# Patient Record
Sex: FEMALE | Race: WHITE | NOT HISPANIC OR LATINO | Employment: OTHER | URBAN - METROPOLITAN AREA
[De-identification: names, ages, dates, MRNs, and addresses within clinical notes are randomized per-mention and may not be internally consistent; named-entity substitution may affect disease eponyms.]

---

## 2023-10-02 DIAGNOSIS — G89.29 CHRONIC PELVIC PAIN IN FEMALE: Primary | ICD-10-CM

## 2023-10-02 DIAGNOSIS — R10.2 CHRONIC PELVIC PAIN IN FEMALE: Primary | ICD-10-CM

## 2023-10-02 RX ORDER — GABAPENTIN 100 MG/1
100 CAPSULE ORAL 2 TIMES DAILY
Qty: 60 CAPSULE | Refills: 0 | Status: SHIPPED | OUTPATIENT
Start: 2023-10-02 | End: 2023-10-11 | Stop reason: SDUPTHER

## 2023-10-03 PROBLEM — R10.2 CHRONIC PAIN IN FEMALE PELVIS: Status: ACTIVE | Noted: 2023-10-03

## 2023-10-03 PROBLEM — L68.0 HIRSUTISM: Status: ACTIVE | Noted: 2023-10-03

## 2023-10-03 PROBLEM — G89.29 CHRONIC PAIN IN FEMALE PELVIS: Status: ACTIVE | Noted: 2023-10-03

## 2023-10-03 PROBLEM — R23.4 OILY SKIN: Status: ACTIVE | Noted: 2023-10-03

## 2023-10-03 PROBLEM — L65.9 ALOPECIA: Status: ACTIVE | Noted: 2023-10-03

## 2023-10-03 PROBLEM — N80.9 ENDOMETRIOSIS: Status: ACTIVE | Noted: 2023-10-03

## 2023-10-03 PROBLEM — M62.838 TRAPEZIUS MUSCLE SPASM: Status: ACTIVE | Noted: 2023-10-03

## 2023-10-03 RX ORDER — CYCLOBENZAPRINE HCL 10 MG
TABLET ORAL
COMMUNITY
Start: 2022-04-26

## 2023-10-03 RX ORDER — DIAZEPAM 2 MG/1
1-2 TABLET ORAL 2 TIMES DAILY PRN
COMMUNITY

## 2023-10-03 RX ORDER — NORETHINDRONE ACETATE AND ETHINYL ESTRADIOL, ETHINYL ESTRADIOL AND FERROUS FUMARATE 1MG-10(24)
1 KIT ORAL DAILY
COMMUNITY
Start: 2023-02-07

## 2023-10-03 RX ORDER — PROGESTERONE 100 MG/1
CAPSULE ORAL
COMMUNITY
Start: 2022-05-24

## 2023-10-03 RX ORDER — CYCLOBENZAPRINE HCL 5 MG
5 TABLET ORAL 2 TIMES DAILY PRN
COMMUNITY
Start: 2023-09-15

## 2023-10-03 RX ORDER — SPIRONOLACTONE 50 MG/1
100 TABLET, FILM COATED ORAL
COMMUNITY
Start: 2023-06-28

## 2023-10-03 RX ORDER — HYDROCODONE BITARTRATE AND ACETAMINOPHEN 5; 325 MG/1; MG/1
1 TABLET ORAL EVERY 6 HOURS
COMMUNITY
Start: 2013-11-19

## 2023-10-03 RX ORDER — ESTRADIOL 0.03 MG/D
1 PATCH, EXTENDED RELEASE TRANSDERMAL 2 TIMES WEEKLY
COMMUNITY

## 2023-10-03 RX ORDER — METHYLPREDNISOLONE 4 MG/1
TABLET ORAL
COMMUNITY
Start: 2022-06-04

## 2023-10-03 RX ORDER — ALBUTEROL SULFATE 90 UG/1
1 AEROSOL, METERED RESPIRATORY (INHALATION) EVERY 4 HOURS PRN
COMMUNITY
Start: 2022-06-04

## 2023-10-05 ENCOUNTER — APPOINTMENT (OUTPATIENT)
Dept: OBSTETRICS AND GYNECOLOGY | Facility: CLINIC | Age: 48
End: 2023-10-05
Payer: COMMERCIAL

## 2023-10-11 ENCOUNTER — OFFICE VISIT (OUTPATIENT)
Dept: PAIN MEDICINE | Facility: HOSPITAL | Age: 48
End: 2023-10-11
Payer: COMMERCIAL

## 2023-10-11 VITALS
HEIGHT: 63 IN | HEART RATE: 91 BPM | RESPIRATION RATE: 16 BRPM | BODY MASS INDEX: 21.48 KG/M2 | SYSTOLIC BLOOD PRESSURE: 112 MMHG | DIASTOLIC BLOOD PRESSURE: 79 MMHG | WEIGHT: 121.2 LBS

## 2023-10-11 DIAGNOSIS — G89.29 CHRONIC PAIN IN FEMALE PELVIS: Primary | ICD-10-CM

## 2023-10-11 DIAGNOSIS — R10.2 CHRONIC PAIN IN FEMALE PELVIS: Primary | ICD-10-CM

## 2023-10-11 PROCEDURE — 1036F TOBACCO NON-USER: CPT | Performed by: PHYSICAL MEDICINE & REHABILITATION

## 2023-10-11 PROCEDURE — 99204 OFFICE O/P NEW MOD 45 MIN: CPT | Performed by: PHYSICAL MEDICINE & REHABILITATION

## 2023-10-11 PROCEDURE — 99214 OFFICE O/P EST MOD 30 MIN: CPT | Performed by: PHYSICAL MEDICINE & REHABILITATION

## 2023-10-11 RX ORDER — GABAPENTIN 300 MG/1
300 CAPSULE ORAL 3 TIMES DAILY
Qty: 90 CAPSULE | Refills: 2 | Status: SHIPPED | OUTPATIENT
Start: 2023-10-11 | End: 2023-11-14 | Stop reason: SDUPTHER

## 2023-10-11 ASSESSMENT — ENCOUNTER SYMPTOMS
LOSS OF SENSATION IN FEET: 0
DEPRESSION: 0
OCCASIONAL FEELINGS OF UNSTEADINESS: 0

## 2023-10-11 ASSESSMENT — PATIENT HEALTH QUESTIONNAIRE - PHQ9
1. LITTLE INTEREST OR PLEASURE IN DOING THINGS: NOT AT ALL
2. FEELING DOWN, DEPRESSED OR HOPELESS: NOT AT ALL
SUM OF ALL RESPONSES TO PHQ9 QUESTIONS 1 AND 2: 0

## 2023-10-11 NOTE — PROGRESS NOTES
Subjective   Patient ID: Elle Dumas is a 48 y.o. female who presents initial evaluation for chronic pelvic pain.    Patient reports the pain started in 2019. Does have a remote history of vacuum-assisted delivery and episiotomy requiring tear repair in 2005, however denies pelvic pain from that procedure. Denies any inciting event in 2019, states the pain initially started in the abdomen with radiated to her left back. Has had multiple procedures and surgeries since that time with goal of pain relief but reports none of these have helped. Underwent colonoscopy due to abdominal pain, no abnormal findings but patient reports she started to have a pulling sensation into her groin after the procedure. Pain was also thought to be due to endometriosis, underwent laparoscopy and eventual hysterectomy without pain relief. Was found to have migration of her hysterectomy surgical clips. Patient also has history of kidney stones and imaging revealed retained stones in her left kidney. Decision was made by Urology to perform kidney stone removal and clip removal simultaneously. Reports resolution of abdominal pain, but continues with pelvic pain.      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 Recently had bilateral pudendal nerve blocks and abdominal trigger point injections on 09/28/2023, states they did not help even temporarily with his pain.  These nerve blocks were done transvaginally by palpation.    Endorses pain with intercourse. Denies any bowel or bladder incontinence.     Now on Gabapentin, Valium, and Cyclobenzaprine as needed, started this regimen last week. Reports she has had the most pain relief with this combination of medications.  She would like to get off Valium.    Has an appointment with Dr. Herrera at [facility] for pudendal neuralgia    Location: midline pelvis and surrounding vagina  Radiation: denies  Quality: currently 2-3/10, at its worst 10/10  Exacerbated by sitting or any  other pressure to the area  Onset, traumatic event: denies    Patient denies bowel/bladder incontinence, denies fever, denies unintentional weight loss, denies clumsiness of hands, feet, or dropping things.  Denies any constitutional or myelopathic symptomatology.      PREVIOUS TREATMENTS  IN THE LAST SIX MONTHS     Active conservative therapy in the last six months (see below)              1. Physical therapy: Pelvic floor physical therapy, currently completing with last appointment last Thursday                                                                                                                     Monitoring and compliance:    ORT:    PDUQ:    Office Agreement: 10/11/23      Review of Systems     Current Outpatient Medications   Medication Instructions    albuterol 90 mcg/actuation inhaler 1 puff, inhalation, Every 4 hours PRN    cyclobenzaprine (Flexeril) 10 mg tablet oral    cyclobenzaprine (FLEXERIL) 5 mg, oral, 2 times daily PRN    diazePAM (Valium) 2 mg tablet 1-2 tablets, 2 times daily PRN, BY MOUTH OR VAGINALL    gabapentin (NEURONTIN) 300 mg, oral, 3 times daily    HYDROcodone-acetaminophen (Norco) 5-325 mg tablet 1 tablet, oral, Every 6 hours    Lo Loestrin Fe 1 mg-10 mcg (24)/10 mcg (2) tablet 1 tablet, oral, Daily    methylPREDNISolone (Medrol Dospak) 4 mg tablets TAKE BY MOUTH AS DIRECTED    progesterone (Prometrium) 100 mg capsule Progesterone 100 MG Oral Capsule   Quantity: 30  Refills: 0        Start : 24-May-2022   Active    spironolactone (ALDACTONE) 100 mg, oral, EVERY MORNING AND 1 TABLET EVERY EVENING    Vivelle-Dot 0.025 mg/24 hr patch 1 patch, transdermal, 2 times weekly        No past medical history on file.     Past Surgical History:   Procedure Laterality Date    BI US GUIDED BREAST LOCALIZATION AND BIOPSY LEFT Left 7/6/2018    BI US GUIDED BREAST LOCALIZATION AND BIOPSY LEFT [facility]        Family History   Problem Relation Name Age of Onset    Diabetes Father           Allergies   Allergen Reactions    Azithromycin Nausea And Vomiting    Ciprofloxacin Nausea And Vomiting    Adhesive Tape-Silicones Rash    Sulfamethoxazole-Trimethoprim Headache        Objective     Vitals:    10/11/23 1223   BP: 112/79   Pulse: 91   Resp: 16        Physical Exam    GENERAL EXAM  Vital Signs: Vital signs to include heart rate, respiration rate, blood pressure, and temperature were reviewed.  General Appearance:  Awake, alert, healthy appearing, well developed, No acute distress.  Head: Normocephalic without evidence of head injury.  Neck: The appearance of the neck was normal without swelling with a midline trachea.  Eyes: The eyelids and eyebrows exhibited no abnormalities.  Pupils were not pin-point.  Sclera was without icterus.  Lungs: Respiration rhythm and depth was normal.  Respiratory movements were normal without labored breathing.  Cardiovascular: No peripheral edema was present.    Neurological: Patient was oriented to time, place, and person.  Speech was normal.  Balance, gait, and stance were unremarkable.    Psychiatric: Appearance was normal with appropriate dress.  Mood was euthymic and affect was normal.  Skin: Affected regions were without ecchymosis or skin lesions.    Physical exam as above except:  Non-antalgic gait  No pain with log roll or hip compression bilaterally. Bilateral hip flexor hypertonicity and decreased internal rotation  5/5 muscle strength bilaterally  Negative slump test and straight leg raise  No pain with abdominal palpation    Assessment/Plan   Problem List Items Addressed This Visit             ICD-10-CM    Chronic pain in female pelvis - Primary R10.2, G89.29    Relevant Medications    gabapentin (Neurontin) 300 mg capsule   Elle Dumas is a 48 y.o. female who presents initial evaluation for chronic pelvic pain. No relief with recent in-office pudendal nerve block though she certainly does have some signs and symptoms of pudendal  neuralgia.    Plan:  - Increase Gabapentin to 300mg TID, patient titrate up per patient instructions given.  Could consider further titration up in the future  - Continue pelvic floor physical therapy, with emphasis on hip flexor ROM  - Consider Duloxetine for nerve/pelvic pain in future  - Consider consider fluoroscopic-guided pudendal nerve block as doing this block with image guidance may be more efficacious.    I saw and evaluated the patient. I personally obtained the key and critical portions of the history and physical exam or was physically present for key and critical portions performed by the resident/fellow. I reviewed the resident/fellow's documentation and discussed the patient with the resident/fellow. I agree with the resident/fellow's medical decision making as documented in the note.    Nash Manuel MD

## 2023-11-14 ENCOUNTER — OFFICE VISIT (OUTPATIENT)
Dept: PAIN MEDICINE | Facility: CLINIC | Age: 48
End: 2023-11-14
Payer: COMMERCIAL

## 2023-11-14 VITALS — DIASTOLIC BLOOD PRESSURE: 80 MMHG | HEART RATE: 76 BPM | RESPIRATION RATE: 16 BRPM | SYSTOLIC BLOOD PRESSURE: 125 MMHG

## 2023-11-14 DIAGNOSIS — R10.2 CHRONIC PAIN IN FEMALE PELVIS: Primary | ICD-10-CM

## 2023-11-14 DIAGNOSIS — G89.29 CHRONIC PAIN IN FEMALE PELVIS: Primary | ICD-10-CM

## 2023-11-14 PROCEDURE — 1036F TOBACCO NON-USER: CPT | Performed by: PHYSICAL MEDICINE & REHABILITATION

## 2023-11-14 PROCEDURE — 99214 OFFICE O/P EST MOD 30 MIN: CPT | Performed by: PHYSICAL MEDICINE & REHABILITATION

## 2023-11-14 RX ORDER — GABAPENTIN 300 MG/1
600 CAPSULE ORAL 3 TIMES DAILY
Qty: 180 CAPSULE | Refills: 2 | Status: SHIPPED | OUTPATIENT
Start: 2023-11-14 | End: 2024-02-29 | Stop reason: SDUPTHER

## 2023-11-14 ASSESSMENT — PAIN SCALES - GENERAL: PAINLEVEL: 4

## 2023-11-14 NOTE — PROGRESS NOTES
Subjective   Patient ID: Elle Dumas is a 48 y.o. female who presents for No chief complaint on file..  HPI    f                  OARRS:  No data recorded  {OARRSReview:00033}    Is the patient prescribed a combination of a benzodiazepine and opioid?  {Opioid/Benzo:33672}    Last Urine Drug Screen / ordered today: {YES (DEF)/NO:97235}  No results found for this or any previous visit (from the past 8760 hour(s)).  {ResultsAsExpected:82547}    Controlled Substance Agreement:  Date of the Last Agreement: ***  {CSA Attest:31321}    Monitoring and compliance:    ORT:    PDUQ:    Office Agreement:      Review of Systems     Current Outpatient Medications   Medication Instructions    albuterol 90 mcg/actuation inhaler 1 puff, inhalation, Every 4 hours PRN    cyclobenzaprine (Flexeril) 10 mg tablet oral    cyclobenzaprine (FLEXERIL) 5 mg, oral, 2 times daily PRN    diazePAM (Valium) 2 mg tablet 1-2 tablets, 2 times daily PRN, BY MOUTH OR VAGINALL    gabapentin (NEURONTIN) 300 mg, oral, 3 times daily    HYDROcodone-acetaminophen (Norco) 5-325 mg tablet 1 tablet, oral, Every 6 hours    Lo Loestrin Fe 1 mg-10 mcg (24)/10 mcg (2) tablet 1 tablet, oral, Daily    methylPREDNISolone (Medrol Dospak) 4 mg tablets TAKE BY MOUTH AS DIRECTED    progesterone (Prometrium) 100 mg capsule Progesterone 100 MG Oral Capsule   Quantity: 30  Refills: 0        Start : 24-May-2022   Active    spironolactone (ALDACTONE) 100 mg, oral, EVERY MORNING AND 1 TABLET EVERY EVENING    Vivelle-Dot 0.025 mg/24 hr patch 1 patch, transdermal, 2 times weekly        No past medical history on file.     Past Surgical History:   Procedure Laterality Date    BI US GUIDED BREAST LOCALIZATION AND BIOPSY LEFT Left 7/6/2018    BI US GUIDED BREAST LOCALIZATION AND BIOPSY LEFT [facility]        Family History   Problem Relation Name Age of Onset    Diabetes Father          Allergies   Allergen Reactions    Azithromycin Nausea And Vomiting    Ciprofloxacin  Nausea And Vomiting    Adhesive Tape-Silicones Rash    Sulfamethoxazole-Trimethoprim Headache        Objective     There were no vitals filed for this visit.     Physical Exam    GENERAL EXAM  Vital Signs: Vital signs to include heart rate, respiration rate, blood pressure, and temperature were reviewed.  General Appearance:  Awake, alert, healthy appearing, well developed, No acute distress.  Head: Normocephalic without evidence of head injury.  Neck: The appearance of the neck was normal without swelling with a midline trachea.  Eyes: The eyelids and eyebrows exhibited no abnormalities.  Pupils were not pin-point.  Sclera was without icterus.  Lungs: Respiration rhythm and depth was normal.  Respiratory movements were normal without labored breathing.  Cardiovascular: No peripheral edema was present.    Neurological: Patient was oriented to time, place, and person.  Speech was normal.  Balance, gait, and stance were unremarkable.    Psychiatric: Appearance was normal with appropriate dress.  Mood was euthymic and affect was normal.  Skin: Affected regions were without ecchymosis or skin lesions.        Physical exam as above except:        Assessment/Plan   {Assess/PlanSmartLinks:57212}

## 2023-11-14 NOTE — PROGRESS NOTES
Subjective   HPI:   Elle Dumas is a 48 year old female with history of hysterectomy and bilateral salpingo-oophorectomy, and chronic pelvic pain who presents for follow up evaluation of her pelvic pain. She was last seen 10/11/23 in clinic where she was started on gabapentin 300 TID for primary diagnosis of pudendal neuralgia. Since then, she also was seen by Dr. Herrera (gynecology) who recommended she stay on gabapentin. Patient reports she had 100% relief after starting gabapentin for 2.5 weeks, but then starting having some discomfort again. She has finn-vaginal pain with pain level VAS 4/10. Her goal pain is 3-4/10 and she is comfortable at this level. She denies any side effects such as headaches or drowsiness. Still reports that intercourse is uncomfortable, but her gynecologist started her on some estrogen cream which she needs to . States that sitting on pelvic floor makes the pain the worst. She has had some relief using a donut pillow. She has previously failed landmark-guided pudendal blocks by gynecology. She goes to pelvic floor PT 1x/week which is helping her.      OARRS:  No data recorded  I have personally reviewed the OARRS report for Elle Dumas. I have considered the risks of abuse, dependence, addiction and diversion    Is the patient prescribed a combination of a benzodiazepine and opioid?  No    Last Urine Drug Screen / ordered today: No  No results found for this or any previous visit (from the past 8760 hour(s)).  N/A    Controlled Substance Agreement:  Date of the Last Agreement: N/A      Review of Symptoms:   Constitutional: Negative for chills, diaphoresis or fever  HENT: Negative for neck swelling  Eyes:.  Negative for eye pain  Respiratory:.  Negative for cough, shortness of breath or wheezing    Cardiovascular:.  Negative for chest pain or palpitations  Gastrointestinal:.  Negative for abdominal pain, nausea and vomiting  Genitourinary:.  Positive for vaginal pain,  vaginal canal pain.  Musculoskeletal:: Denies falls within the past 3 months.  Skin: Negative for wounds or itching   Neurological: Negative for dizziness, seizures, loss of consciousness and weakness  Endo/Heme/Allergies: Does not bruise/bleed easily  Psychiatric/Behavioral: Negative for depression. The patient does not appear anxious.        Current Outpatient Medications   Medication Instructions    albuterol 90 mcg/actuation inhaler 1 puff, inhalation, Every 4 hours PRN    cyclobenzaprine (Flexeril) 10 mg tablet oral    cyclobenzaprine (FLEXERIL) 5 mg, oral, 2 times daily PRN    diazePAM (Valium) 2 mg tablet 1-2 tablets, 2 times daily PRN, BY MOUTH OR VAGINALL    gabapentin (NEURONTIN) 600 mg, oral, 3 times daily    HYDROcodone-acetaminophen (Norco) 5-325 mg tablet 1 tablet, oral, Every 6 hours    Lo Loestrin Fe 1 mg-10 mcg (24)/10 mcg (2) tablet 1 tablet, oral, Daily    methylPREDNISolone (Medrol Dospak) 4 mg tablets TAKE BY MOUTH AS DIRECTED    progesterone (Prometrium) 100 mg capsule Progesterone 100 MG Oral Capsule   Quantity: 30  Refills: 0        Start : 24-May-2022   Active    spironolactone (ALDACTONE) 100 mg, oral, EVERY MORNING AND 1 TABLET EVERY EVENING    Vivelle-Dot 0.025 mg/24 hr patch 1 patch, transdermal, 2 times weekly        No past medical history on file.     Past Surgical History:   Procedure Laterality Date    BI US GUIDED BREAST LOCALIZATION AND BIOPSY LEFT Left 7/6/2018    BI US GUIDED BREAST LOCALIZATION AND BIOPSY LEFT [facility]        Family History   Problem Relation Name Age of Onset    Diabetes Father          Allergies   Allergen Reactions    Azithromycin Nausea And Vomiting    Ciprofloxacin Nausea And Vomiting    Adhesive Tape-Silicones Rash    Sulfamethoxazole-Trimethoprim Headache        Objective     Vitals:    11/14/23 1107   BP: 125/80   Pulse: 76   Resp: 16        Physical Exam    GENERAL EXAM  Vital Signs: Vital signs to include heart rate, respiration rate, blood  pressure, and temperature were reviewed.  General Appearance:  Awake, alert, healthy appearing, well developed, No acute distress.  Head: Normocephalic without evidence of head injury.  Neck: The appearance of the neck was normal without swelling with a midline trachea.  Eyes: The eyelids and eyebrows exhibited no abnormalities.  Pupils were not pin-point.  Sclera was without icterus.  Lungs: Respiration rhythm and depth was normal.  Respiratory movements were normal without labored breathing.  Cardiovascular: No peripheral edema was present.    Neurological: Patient was oriented to time, place, and person.  Speech was normal.  Balance, gait, and stance were unremarkable.    Psychiatric: Appearance was normal with appropriate dress.  Mood was euthymic and affect was normal.  Skin: Affected regions were without ecchymosis or skin lesions.      Assessment/Plan     Elle Dumas is a 48 year old female with history of hysterectomy and bilateral salpingo-oophorectomy, and chronic pelvic pain who presents for follow up evaluation of her perivaginal pelvic pain likely due to pudendal neuralgia. She was started on gabapentin 10/2023 and has had a great response of her pain relief, but could use further uptitration for pain control.     Plan:  Uptitrate gabapentin to 600mg PO TID. Discussed risks and benefits.   Continue pelvic floor physical therapy, with emphasis on hip flexor ROM   Consider fluroscopic-guided pudendal nerve block as doing this block with image guidance can be more efficacious       Diagnoses and all orders for this visit:  Chronic pain in female pelvis  -     gabapentin (Neurontin) 300 mg capsule; Take 2 capsules (600 mg) by mouth 3 times a day.         I saw and evaluated the patient. I personally obtained the key and critical portions of the history and physical exam or was physically present for key and critical portions performed by the resident/fellow. I reviewed the resident/fellow's  documentation and discussed the patient with the resident/fellow. I agree with the resident/fellow's medical decision making as documented in the note.    Nash Manuel MD

## 2023-12-20 ENCOUNTER — TELEPHONE (OUTPATIENT)
Dept: PAIN MEDICINE | Facility: CLINIC | Age: 48
End: 2023-12-20
Payer: COMMERCIAL

## 2023-12-20 NOTE — TELEPHONE ENCOUNTER
Spoke with patient on the phone. She is trying to see if a different timing/dose of gabapentin works better for her. She is also experiencing some weight gain which could be from the gabapentin. She will try skipping the afternoon dose and take more in the AM and PM to see if it provides pain relief without making her as drowsy throughout the day.  Invited her to call the office if she needs a refill before her next visit 1/23/24.

## 2024-01-23 ENCOUNTER — OFFICE VISIT (OUTPATIENT)
Dept: PAIN MEDICINE | Facility: CLINIC | Age: 49
End: 2024-01-23
Payer: COMMERCIAL

## 2024-01-23 VITALS — SYSTOLIC BLOOD PRESSURE: 123 MMHG | HEART RATE: 73 BPM | RESPIRATION RATE: 16 BRPM | DIASTOLIC BLOOD PRESSURE: 79 MMHG

## 2024-01-23 DIAGNOSIS — G89.29 CHRONIC PAIN IN FEMALE PELVIS: Primary | ICD-10-CM

## 2024-01-23 DIAGNOSIS — G58.8 NEURALGIA OF BOTH PUDENDAL NERVES: ICD-10-CM

## 2024-01-23 DIAGNOSIS — R10.2 CHRONIC PAIN IN FEMALE PELVIS: Primary | ICD-10-CM

## 2024-01-23 PROCEDURE — 99214 OFFICE O/P EST MOD 30 MIN: CPT | Performed by: PHYSICAL MEDICINE & REHABILITATION

## 2024-01-23 PROCEDURE — 1036F TOBACCO NON-USER: CPT | Performed by: PHYSICAL MEDICINE & REHABILITATION

## 2024-01-23 ASSESSMENT — PAIN SCALES - GENERAL: PAINLEVEL: 4

## 2024-01-23 NOTE — H&P (VIEW-ONLY)
Subjective   Patient ID: Elle Dumas is a 48 y.o. female who presents for Follow-up.  HPI  Elle Dumas is a 48 year old female with history of hysterectomy and bilateral salpingo-oophorectomy, and chronic pelvic pain who presents for follow up evaluation of her pelvic pain. She was last seen 11/14/23 in clinic where she was given uptitration instructions for her gabapentin to 600 mg TID for primary diagnosis of pudendal neuralgia. States no drowsiness or side effects from the gabapentin aside from weight gain and is interested in possibly uptitrating gabapentin    INTERVAL HISTORY    She continues to have finn-vaginal pain with pain level VAS 4/10, worse with sitting. Pain now with radiation down left lower leg into the foot over the past month, pain is worse when sitting in a slouched position. Still reports that intercourse is uncomfortable, but her gynecologist started her on an estrogen pill but she has not taken due to concerns over side effects. She has had some relief using a donut pillow. She has previously failed landmark-guided pudendal blocks by gynecology. She goes to pelvic floor PT 1x/week which is helping her. Patient states increased gabapentin is helpful, no longer in extreme pain. She is interested in a different approach pudendal nerve block.  Does have back pain as well at baseline is having some more aching down her left leg into her foot as well.                  Monitoring and compliance:    ORT:    PDUQ:    Office Agreement:      Review of Systems     Current Outpatient Medications   Medication Instructions    albuterol 90 mcg/actuation inhaler 1 puff, inhalation, Every 4 hours PRN    cyclobenzaprine (Flexeril) 10 mg tablet oral    cyclobenzaprine (FLEXERIL) 5 mg, oral, 2 times daily PRN    diazePAM (Valium) 2 mg tablet 1-2 tablets, 2 times daily PRN, BY MOUTH OR VAGINALL    gabapentin (NEURONTIN) 600 mg, oral, 3 times daily    HYDROcodone-acetaminophen (Norco) 5-325 mg tablet 1  tablet, oral, Every 6 hours    Lo Loestrin Fe 1 mg-10 mcg (24)/10 mcg (2) tablet 1 tablet, oral, Daily    methylPREDNISolone (Medrol Dospak) 4 mg tablets TAKE BY MOUTH AS DIRECTED    progesterone (Prometrium) 100 mg capsule Progesterone 100 MG Oral Capsule   Quantity: 30  Refills: 0        Start : 24-May-2022   Active    spironolactone (ALDACTONE) 100 mg, oral, EVERY MORNING AND 1 TABLET EVERY EVENING    Vivelle-Dot 0.025 mg/24 hr patch 1 patch, transdermal, 2 times weekly        No past medical history on file.     Past Surgical History:   Procedure Laterality Date    BI US GUIDED BREAST LOCALIZATION AND BIOPSY LEFT Left 7/6/2018    BI US GUIDED BREAST LOCALIZATION AND BIOPSY LEFT [facility]        Family History   Problem Relation Name Age of Onset    Diabetes Father          Allergies   Allergen Reactions    Azithromycin Nausea And Vomiting    Ciprofloxacin Nausea And Vomiting    Adhesive Tape-Silicones Rash    Sulfamethoxazole-Trimethoprim Headache        Objective     Vitals:    01/23/24 1101   BP: 123/79   Pulse: 73   Resp: 16        Physical Exam    GENERAL EXAM  Vital Signs: Vital signs to include heart rate, respiration rate, blood pressure, and temperature were reviewed.  General Appearance:  Awake, alert, healthy appearing, well developed, No acute distress.  Head: Normocephalic without evidence of head injury.  Neck: The appearance of the neck was normal without swelling with a midline trachea.  Eyes: The eyelids and eyebrows exhibited no abnormalities.  Pupils were not pin-point.  Sclera was without icterus.  Lungs: Respiration rhythm and depth was normal.  Respiratory movements were normal without labored breathing.  Cardiovascular: No peripheral edema was present.    Neurological: Patient was oriented to time, place, and person.  Speech was normal.  Balance, gait, and stance were unremarkable.    Psychiatric: Appearance was normal with appropriate dress.  Mood was euthymic and affect was  normal.  Skin: Affected regions were without ecchymosis or skin lesions.    Strength and sensation in tact in bilateral lower extremities        Assessment/Plan   Diagnoses and all orders for this visit:  Chronic pain in female pelvis  -     Nerve Block; Future  Neuralgia of both pudendal nerves  -     Nerve Block; Future    Elle Dumas is a 48 year old female with history of hysterectomy and bilateral salpingo-oophorectomy, and chronic pelvic pain who presents for follow up evaluation of her perivaginal pelvic pain likely due to pudendal neuralgia. She was started on gabapentin 10/2023 and has had a great response of her pain relief, but has had weight gain of 10 lbs though she acknowledges that it may not be related to gabapentin.  Also may have a radicular component to her pain as well from her back.    -Continue gabapentin as prescribed, could increase in the future but will hold off for now  -Continue pevlic PT with emphasis on hip flexor ROM  -Schedule fluoroscopic guided bilateral pudendal nerve block  -Can consider lumbar/radicular causes of pain given shooting pain down LE into the feet. Will get updated imaging if pudendal nerve block not effective           This note was generated with the aid of dictation software, there may be typos despite my attempts at proofreading.     Patient seen and examined by resident with attending supervision (Dr. Manuel), attending agrees with history, exam, and plan as described in the note above    I saw and evaluated the patient. I personally obtained the key and critical portions of the history and physical exam or was physically present for key and critical portions performed by the resident/fellow. I reviewed the resident/fellow's documentation and discussed the patient with the resident/fellow. I agree with the resident/fellow's medical decision making as documented in the note.    Nash Manuel MD

## 2024-02-16 ENCOUNTER — HOSPITAL ENCOUNTER (OUTPATIENT)
Dept: RADIOLOGY | Facility: CLINIC | Age: 49
Setting detail: OUTPATIENT SURGERY
Discharge: HOME | End: 2024-02-16
Payer: COMMERCIAL

## 2024-02-16 ENCOUNTER — HOSPITAL ENCOUNTER (OUTPATIENT)
Dept: OPERATING ROOM | Facility: CLINIC | Age: 49
Setting detail: OUTPATIENT SURGERY
Discharge: HOME | End: 2024-02-16
Payer: COMMERCIAL

## 2024-02-16 VITALS
BODY MASS INDEX: 22.93 KG/M2 | SYSTOLIC BLOOD PRESSURE: 116 MMHG | WEIGHT: 129.41 LBS | HEIGHT: 63 IN | OXYGEN SATURATION: 99 % | RESPIRATION RATE: 16 BRPM | TEMPERATURE: 97.9 F | DIASTOLIC BLOOD PRESSURE: 77 MMHG | HEART RATE: 73 BPM

## 2024-02-16 DIAGNOSIS — G58.8 NEURALGIA OF BOTH PUDENDAL NERVES: ICD-10-CM

## 2024-02-16 DIAGNOSIS — R10.2 CHRONIC PAIN IN FEMALE PELVIS: ICD-10-CM

## 2024-02-16 DIAGNOSIS — G89.29 CHRONIC PAIN IN FEMALE PELVIS: ICD-10-CM

## 2024-02-16 PROCEDURE — 2500000005 HC RX 250 GENERAL PHARMACY W/O HCPCS: Performed by: PHYSICAL MEDICINE & REHABILITATION

## 2024-02-16 PROCEDURE — 64430 NJX AA&/STRD PUDENDAL NERVE: CPT | Performed by: PHYSICAL MEDICINE & REHABILITATION

## 2024-02-16 PROCEDURE — 2500000004 HC RX 250 GENERAL PHARMACY W/ HCPCS (ALT 636 FOR OP/ED): Performed by: PHYSICAL MEDICINE & REHABILITATION

## 2024-02-16 PROCEDURE — 64430 NJX AA&/STRD PUDENDAL NERVE: CPT | Mod: 50 | Performed by: PHYSICAL MEDICINE & REHABILITATION

## 2024-02-16 PROCEDURE — 2550000001 HC RX 255 CONTRASTS: Performed by: PHYSICAL MEDICINE & REHABILITATION

## 2024-02-16 PROCEDURE — 2500000002 HC RX 250 W HCPCS SELF ADMINISTERED DRUGS (ALT 637 FOR MEDICARE OP, ALT 636 FOR OP/ED): Performed by: PHYSICAL MEDICINE & REHABILITATION

## 2024-02-16 RX ORDER — METHYLPREDNISOLONE ACETATE 40 MG/ML
INJECTION, SUSPENSION INTRA-ARTICULAR; INTRALESIONAL; INTRAMUSCULAR; SOFT TISSUE AS NEEDED
Status: COMPLETED | OUTPATIENT
Start: 2024-02-16 | End: 2024-02-16

## 2024-02-16 RX ORDER — DIAZEPAM 5 MG/1
5 TABLET ORAL ONCE
Status: COMPLETED | OUTPATIENT
Start: 2024-02-16 | End: 2024-02-16

## 2024-02-16 RX ORDER — LIDOCAINE HYDROCHLORIDE 5 MG/ML
INJECTION, SOLUTION INFILTRATION; PERINEURAL AS NEEDED
Status: COMPLETED | OUTPATIENT
Start: 2024-02-16 | End: 2024-02-16

## 2024-02-16 RX ADMIN — METHYLPREDNISOLONE ACETATE 20 MG: 40 INJECTION, SUSPENSION INTRA-ARTICULAR; INTRALESIONAL; INTRAMUSCULAR; SOFT TISSUE at 11:21

## 2024-02-16 RX ADMIN — DIAZEPAM 5 MG: 5 TABLET ORAL at 10:29

## 2024-02-16 RX ADMIN — IOHEXOL 1 ML: 240 INJECTION, SOLUTION INTRATHECAL; INTRAVASCULAR; INTRAVENOUS; ORAL at 11:19

## 2024-02-16 RX ADMIN — METHYLPREDNISOLONE ACETATE 20 MG: 40 INJECTION, SUSPENSION INTRA-ARTICULAR; INTRALESIONAL; INTRAMUSCULAR; SOFT TISSUE at 11:20

## 2024-02-16 RX ADMIN — LIDOCAINE HYDROCHLORIDE 5 ML: 5 INJECTION, SOLUTION INFILTRATION; PERINEURAL at 11:19

## 2024-02-16 ASSESSMENT — PAIN - FUNCTIONAL ASSESSMENT
PAIN_FUNCTIONAL_ASSESSMENT: 0-10

## 2024-02-16 ASSESSMENT — COLUMBIA-SUICIDE SEVERITY RATING SCALE - C-SSRS
6. HAVE YOU EVER DONE ANYTHING, STARTED TO DO ANYTHING, OR PREPARED TO DO ANYTHING TO END YOUR LIFE?: NO
1. IN THE PAST MONTH, HAVE YOU WISHED YOU WERE DEAD OR WISHED YOU COULD GO TO SLEEP AND NOT WAKE UP?: NO
2. HAVE YOU ACTUALLY HAD ANY THOUGHTS OF KILLING YOURSELF?: NO

## 2024-02-16 ASSESSMENT — PAIN SCALES - GENERAL
PAINLEVEL_OUTOF10: 4
PAINLEVEL_OUTOF10: 2
PAINLEVEL_OUTOF10: 2

## 2024-02-16 ASSESSMENT — PAIN DESCRIPTION - DESCRIPTORS: DESCRIPTORS: ACHING

## 2024-02-16 NOTE — DISCHARGE INSTRUCTIONS
Discharge Instructions for Anesthesiology Pain Service Patients - Dr. Manuel    Observe the needle site for excessive bleeding (slow general oozing that completely soaks the dressing or fresh bright red bleeding).  In either case, apply pressure to the area, elevate it if possible and call your doctor at once.    Observe/monitor for the following signs and symptoms:         Needle site:  change in color, numbness or tingling, coldness to touch, swelling, drainage       Temperature of 101.5 or higher       Increased or uncontrollable pain    If you notice any of the above signs or symptoms, please call your doctor at once.    Your pain may not be gone immediately after this procedure; it generally takes 3 to 5 days for the steroid to work.    Keep the needle site clean and dry for 24 hours.    Continue your present medications.    No driving or operating machinery for 24 hours if you have received sedation.    Make an appointment to see you doctor in 2-3 weeks    Central Scheduling Number:  [phone]  Dr. Manuel's office:  [phone]  Dr. Manuel's line:  [phone]  After hours Pain Management:  [phone].    If any problems occur, or if you have further questions, please call you doctor as soon as possible.  If you find that you cannot reach your doctor, but feel that your condition needs a doctors attention, go to the  emergency room nearest your home.

## 2024-02-16 NOTE — PROCEDURES
Nerve Block    Date/Time: 2/16/2024 11:12 AM    Performed by: Nash Manuel MD  Authorized by: Nash Manuel MD    Consent:     Consent obtained:  Written    Consent given by:  Patient    Risks, benefits, and alternatives were discussed: yes      Risks discussed:  Nerve damage, infection, allergic reaction, swelling, bleeding and pain    Alternatives discussed:  No treatment, delayed treatment and alternative treatment  Universal protocol:     Procedure explained and questions answered to patient or proxy's satisfaction: yes      Relevant documents present and verified: yes      Test results available: yes      Imaging studies available: yes      Required blood products, implants, devices, and special equipment available: yes      Site/side marked: yes      Immediately prior to procedure, a time out was called: yes      Patient identity confirmed:  Verbally with patient, hospital-assigned identification number and arm band  Comments:      Pudendal nerve block    The patient was positioned comfortably in the prone position and was prepped and draped in a sterile fashion.  Sterile precautions, including sterile gloves, disposable cap and masks were worn for the procedure at all times.  The ischial spine was identified under fluoroscopy.  There was no evidence of infection at the site(s) of needle insertion. The skin and subcutaneous tissue at insertion site was anesthetized with 1% lidocaine with or without sodium bicarbonate.   A needle was advanced to the ischial spine.  Position was confirmed in the AP and lateral positions.  The needle was then advanced just medial to the ischial spine.  2 mL of Iodinized contrast was injected confirming needle placement was not vascular.  Below medications were then injected.  The needle was flushed and/or restyletted and removed.  Pressure was held, and after ensuring hemostasis and the absence of hematoma, an adhesive bandage was applied to the site of needle  insertion.  Nerve: Pudendal nerve  Laterality:   [bilateral]  Medications: [5mL 0.5% Ropivacaine] [40 mg Depo-Medrol] divided between side(s)

## 2024-02-19 NOTE — ADDENDUM NOTE
Encounter addended by: Nicolasa Barber RN on: 2/19/2024 9:31 AM   Actions taken: Contacts section saved, Flowsheet accepted

## 2024-02-29 DIAGNOSIS — R10.2 CHRONIC PAIN IN FEMALE PELVIS: ICD-10-CM

## 2024-02-29 DIAGNOSIS — G89.29 CHRONIC PAIN IN FEMALE PELVIS: ICD-10-CM

## 2024-02-29 RX ORDER — GABAPENTIN 300 MG/1
600 CAPSULE ORAL 3 TIMES DAILY
Qty: 180 CAPSULE | Refills: 3 | Status: SHIPPED | OUTPATIENT
Start: 2024-02-29 | End: 2024-06-28

## 2024-02-29 NOTE — TELEPHONE ENCOUNTER
Patient had to go to [place] due to mental health emergency for her son. She is requesting her gabapentin refill to be sent to [facility] pharmacy on [facility]. She will call and have them transfer it to a [facility] in [place] as she is expecting to be down there for the month of March. She will call and reschedule her follow up visit at a later date.

## 2024-03-12 ENCOUNTER — APPOINTMENT (OUTPATIENT)
Dept: PAIN MEDICINE | Facility: CLINIC | Age: 49
End: 2024-03-12
Payer: COMMERCIAL

## 2024-05-10 ENCOUNTER — APPOINTMENT (OUTPATIENT)
Dept: PAIN MEDICINE | Facility: HOSPITAL | Age: 49
End: 2024-05-10
Payer: COMMERCIAL

## 2024-05-23 ENCOUNTER — APPOINTMENT (OUTPATIENT)
Dept: PAIN MEDICINE | Facility: CLINIC | Age: 49
End: 2024-05-23
Payer: COMMERCIAL

## 2024-05-23 ENCOUNTER — OFFICE VISIT (OUTPATIENT)
Dept: PAIN MEDICINE | Facility: CLINIC | Age: 49
End: 2024-05-23
Payer: COMMERCIAL

## 2024-05-23 ENCOUNTER — LAB (OUTPATIENT)
Dept: LAB | Facility: LAB | Age: 49
End: 2024-05-23
Payer: COMMERCIAL

## 2024-05-23 DIAGNOSIS — Z79.899 LONG-TERM USE OF HIGH-RISK MEDICATION: ICD-10-CM

## 2024-05-23 DIAGNOSIS — G58.8 NEURALGIA OF BOTH PUDENDAL NERVES: Primary | ICD-10-CM

## 2024-05-23 LAB
AMPHETAMINES UR QL SCN: NORMAL
BARBITURATES UR QL SCN: NORMAL
BENZODIAZ UR QL SCN: NORMAL
BZE UR QL SCN: NORMAL
CANNABINOIDS UR QL SCN: NORMAL
FENTANYL+NORFENTANYL UR QL SCN: NORMAL
METHADONE UR QL SCN: NORMAL
OPIATES UR QL SCN: NORMAL
OXYCODONE+OXYMORPHONE UR QL SCN: NORMAL
PCP UR QL SCN: NORMAL

## 2024-05-23 PROCEDURE — 80307 DRUG TEST PRSMV CHEM ANLYZR: CPT

## 2024-05-23 PROCEDURE — 99214 OFFICE O/P EST MOD 30 MIN: CPT | Performed by: PHYSICAL MEDICINE & REHABILITATION

## 2024-05-23 RX ORDER — PREGABALIN 75 MG/1
75 CAPSULE ORAL 2 TIMES DAILY
Qty: 60 CAPSULE | Refills: 2 | Status: SHIPPED | OUTPATIENT
Start: 2024-05-23 | End: 2024-08-21

## 2024-05-23 ASSESSMENT — PAIN - FUNCTIONAL ASSESSMENT: PAIN_FUNCTIONAL_ASSESSMENT: 0-10

## 2024-05-23 ASSESSMENT — PAIN SCALES - GENERAL: PAINLEVEL_OUTOF10: 6

## 2024-05-23 NOTE — PROGRESS NOTES
Subjective   Patient ID: Elle Dumas is a 49 y.o. female who presents for pudental nerve pain (50 y/o female c/o pudental nerve pain).  HPI    49-year-old female with pedal nerve pain that has responded well to previous pudendal nerve blocks.  She did present that get the length of relief in the last 1 though it is unclear exactly as to the amount of relief as it has been now about 3 to 4 months since I saw her.  She has been doing well on the gabapentin in terms of pain control however she started to get more swelling in her hands and feet and wanted to discuss rotating this medication to something else.  She was also sent back by her OB/GYN for more high-volume bilateral pudendal nerve block as she is potentially going to have a nerve decompression surgery if she gets good relief again from the repeat block.  Symptoms are otherwise similar to my previous evaluation.                  OARRS:  No data recorded  I have personally reviewed the OARRS report for Elle Dumas. I have considered the risks of abuse, dependence, addiction and diversion and I believe that it is clinically appropriate for Elle Dumas to be prescribed this medication    Is the patient prescribed a combination of a benzodiazepine and opioid?  No    Last Urine Drug Screen / ordered today: Yes  No results found for this or any previous visit (from the past 8760 hour(s)).  N/A    Controlled Substance Agreement:  Date of the Last Agreement: 5/23/24  Reviewed Controlled Substance Agreement including but not limited to the benefits, risks, and alternatives to treatment with a Controlled Substance medication(s).    Monitoring and compliance:    ORT:    PDUQ:    Office Agreement:      Review of Systems     Current Outpatient Medications   Medication Instructions    albuterol 90 mcg/actuation inhaler 1 puff, inhalation, Every 4 hours PRN    cyclobenzaprine (Flexeril) 10 mg tablet oral    cyclobenzaprine (FLEXERIL) 5 mg, oral, 2 times daily  PRN    diazePAM (Valium) 2 mg tablet 1-2 tablets, 2 times daily PRN, BY MOUTH OR VAGINALL    gabapentin (NEURONTIN) 600 mg, oral, 3 times daily    HYDROcodone-acetaminophen (Norco) 5-325 mg tablet 1 tablet, oral, Every 6 hours    Lo Loestrin Fe 1 mg-10 mcg (24)/10 mcg (2) tablet 1 tablet, oral, Daily    methylPREDNISolone (Medrol Dospak) 4 mg tablets TAKE BY MOUTH AS DIRECTED    progesterone (Prometrium) 100 mg capsule Progesterone 100 MG Oral Capsule   Quantity: 30  Refills: 0        Start : 24-May-2022   Active    spironolactone (ALDACTONE) 100 mg, oral, EVERY MORNING AND 1 TABLET EVERY EVENING    Vivelle-Dot 0.025 mg/24 hr patch 1 patch, transdermal, 2 times weekly        Past Medical History:   Diagnosis Date    Chronic pain disorder     Endometriosis     Low back pain         Past Surgical History:   Procedure Laterality Date    BI US GUIDED BREAST LOCALIZATION AND BIOPSY LEFT Left 07/06/2018    BI US GUIDED BREAST LOCALIZATION AND BIOPSY LEFT [facility]    TRIGGER POINT INJECTION          Family History   Problem Relation Name Age of Onset    Diabetes Father Morales     Stroke Father Morales     Depression Sister Kaela     Depression Son Hung     Mental illness Son Hung     Depression Son Anastacio     Mental illness Son Anastacio     Seizures Son Anastacio         Allergies   Allergen Reactions    Azithromycin Nausea And Vomiting    Ciprofloxacin Nausea And Vomiting    Adhesive Tape-Silicones Rash    Sulfamethoxazole-Trimethoprim Headache        Objective     There were no vitals filed for this visit.     Physical Exam    GENERAL EXAM  Vital Signs: Vital signs to include heart rate, respiration rate, blood pressure, and temperature were reviewed.  General Appearance:  Awake, alert, healthy appearing, well developed, No acute distress.  Head: Normocephalic without evidence of head injury.  Neck: The appearance of the neck was normal without swelling with a midline trachea.  Eyes: The eyelids and eyebrows  exhibited no abnormalities.  Pupils were not pin-point.  Sclera was without icterus.  Lungs: Respiration rhythm and depth was normal.  Respiratory movements were normal without labored breathing.  Cardiovascular: No peripheral edema was present.    Neurological: Patient was oriented to time, place, and person.  Speech was normal.  Balance, gait, and stance were unremarkable.    Psychiatric: Appearance was normal with appropriate dress.  Mood was euthymic and affect was normal.  Skin: Affected regions were without ecchymosis or skin lesions.    Assessment/Plan   Diagnoses and all orders for this visit:  Neuralgia of both pudendal nerves  -     pregabalin (Lyrica) 75 mg capsule; Take 1 capsule (75 mg) by mouth 2 times a day.  -     Nerve Block; Future  Long-term use of high-risk medication  -     Drug Screen, Urine With Reflex to Confirmation; Future  -     pregabalin (Lyrica) 75 mg capsule; Take 1 capsule (75 mg) by mouth 2 times a day.    49-year-old female with pudendal neuralgia.  Based on review of her OB/GYN's notes were planning on repeating her bilateral pudendal nerve block under fluoroscopic guidance with maybe a slightly higher volume.  We also discussed multiple other treatment options moving forward to include switching to Lyrica as well as potential for peripheral nerve stimulation.  Our plan for today:  - As above we will repeat her bilateral pudendal nerve block for the pudendal neuralgia.  Discussed risk benefits alternatives and patient would like to proceed.  - I did review patient's OARRS, we will plan on switching her to Lyrica 75 mg twice daily to start with that she is starting to have some side effects to the gabapentin in the form of swelling.  Controlled substance agreement and urine drug screen completed today.  We will plan on probably titrating her up in the future.  - I did give her some information about [employer] temporary 60-day peripheral nerve stimulation to the pedal nerves as I think  this may be a good temporary option that may even give her longer term relief and is not a permanent implantation.  Patient will also look over this and think about this.  - We also may want to consider in the future working her up for some back pain as I do wonder if there is more of a lower sacral neuritis picture causing some of her symptoms as well.       This note was generated with the aid of dictation software, there may be typos despite my attempts at proofreading.

## 2024-06-18 NOTE — H&P
History Of Present Illness  Elle Dumas is a 49 y.o. female presents for procedure state below. Endorses no changes in past medical history or medical health since last seen in clinic.     Past Medical History  She has a past medical history of Chronic pain disorder, Endometriosis, and Low back pain.    Surgical History  She has a past surgical history that includes BI US guided breast localization and biopsy left (Left, 07/06/2018) and Trigger point injection.     Social History  She reports that she has never smoked. She has never used smokeless tobacco. She reports that she does not drink alcohol and does not use drugs.    Family History  Family History   Problem Relation Name Age of Onset    Diabetes Father Morales     Stroke Father Morales     Depression Sister Kaela     Depression Son Hung     Mental illness Son Hung     Depression Son Anastacio     Mental illness Son Anastacio     Seizures Son Anastacio         Allergies  Azithromycin, Ciprofloxacin, Adhesive tape-silicones, and Sulfamethoxazole-trimethoprim    Review of Symptoms:   Constitutional: Negative for chills, diaphoresis or fever  HENT: Negative for neck swelling  Eyes:.  Negative for eye pain  Respiratory:.  Negative for cough, shortness of breath or wheezing    Cardiovascular:.  Negative for chest pain or palpitations  Gastrointestinal:.  Negative for abdominal pain, nausea and vomiting  Genitourinary:.  Negative for urgency  Musculoskeletal:  Positive for back pain. Positive for joint pain. Denies falls within the past 3 months.  Skin: Negative for wounds or itching   Neurological: Negative for dizziness, seizures, loss of consciousness and weakness  Endo/Heme/Allergies: Does not bruise/bleed easily  Psychiatric/Behavioral: Negative for depression. The patient does not appear anxious.       PHYSICAL EXAM  Vitals signs reviewed  Constitutional:       General: Not in acute distress     Appearance: Normal appearance. Not ill-appearing.  HENT:     Head:  Normocephalic and atraumatic  Eyes:     Conjunctiva/sclera: Conjunctivae normal  Cardiovascular:     Rate and Rhythm: Normal rate and regular rhythm  Pulmonary:     Effort: No respiratory distress  Abdominal:     Palpations: Abdomen is soft  Musculoskeletal: SHIPMAN  Skin:     General: Skin is warm and dry  Neurological:     General: No focal deficit present  Psychiatric:         Mood and Affect: Mood normal         Behavior: Behavior normal     Last Recorded Vitals  There were no vitals taken for this visit.    Relevant Results  Current Outpatient Medications   Medication Instructions    albuterol 90 mcg/actuation inhaler 1 puff, inhalation, Every 4 hours PRN    cyclobenzaprine (Flexeril) 10 mg tablet oral    cyclobenzaprine (FLEXERIL) 5 mg, oral, 2 times daily PRN    diazePAM (Valium) 2 mg tablet 1-2 tablets, 2 times daily PRN, BY MOUTH OR VAGINALL    gabapentin (NEURONTIN) 600 mg, oral, 3 times daily    HYDROcodone-acetaminophen (Norco) 5-325 mg tablet 1 tablet, oral, Every 6 hours    Lo Loestrin Fe 1 mg-10 mcg (24)/10 mcg (2) tablet 1 tablet, oral, Daily    methylPREDNISolone (Medrol Dospak) 4 mg tablets TAKE BY MOUTH AS DIRECTED    pregabalin (LYRICA) 75 mg, oral, 2 times daily    progesterone (Prometrium) 100 mg capsule Progesterone 100 MG Oral Capsule   Quantity: 30  Refills: 0        Start : 24-May-2022   Active    spironolactone (ALDACTONE) 100 mg, oral, EVERY MORNING AND 1 TABLET EVERY EVENING    Vivelle-Dot 0.025 mg/24 hr patch 1 patch, transdermal, 2 times weekly       No results found for this or any previous visit from the past 1000 days.     No image results found.       No diagnosis found.     ASSESSMENT/PLAN  Elle Dumas is a 49 y.o. female presents for bilateral pudendal nerve block     Our plan is as follows:  - Follow In pain clinic  - Continue to participate in physical therapy as well as physician directed home exercises  - Continue pain medications as prescribed       Lion Wayne MD

## 2024-06-19 ENCOUNTER — HOSPITAL ENCOUNTER (OUTPATIENT)
Dept: RADIOLOGY | Facility: CLINIC | Age: 49
Discharge: HOME | End: 2024-06-19
Payer: COMMERCIAL

## 2024-06-19 ENCOUNTER — HOSPITAL ENCOUNTER (OUTPATIENT)
Dept: OPERATING ROOM | Facility: CLINIC | Age: 49
Setting detail: OUTPATIENT SURGERY
Discharge: HOME | End: 2024-06-19
Payer: COMMERCIAL

## 2024-06-19 VITALS
HEIGHT: 63 IN | DIASTOLIC BLOOD PRESSURE: 68 MMHG | OXYGEN SATURATION: 100 % | TEMPERATURE: 98.4 F | RESPIRATION RATE: 16 BRPM | HEART RATE: 69 BPM | BODY MASS INDEX: 23.95 KG/M2 | SYSTOLIC BLOOD PRESSURE: 128 MMHG | WEIGHT: 135.14 LBS

## 2024-06-19 DIAGNOSIS — G58.8 NEURALGIA OF BOTH PUDENDAL NERVES: ICD-10-CM

## 2024-06-19 PROCEDURE — 2500000004 HC RX 250 GENERAL PHARMACY W/ HCPCS (ALT 636 FOR OP/ED): Performed by: PHYSICAL MEDICINE & REHABILITATION

## 2024-06-19 PROCEDURE — 3600000006 HC OR TIME - EACH INCREMENTAL 1 MINUTE - PROCEDURE LEVEL ONE

## 2024-06-19 PROCEDURE — 96373 THER/PROPH/DIAG INJ IA: CPT | Performed by: PHYSICAL MEDICINE & REHABILITATION

## 2024-06-19 PROCEDURE — 2550000001 HC RX 255 CONTRASTS: Performed by: PHYSICAL MEDICINE & REHABILITATION

## 2024-06-19 PROCEDURE — 2500000005 HC RX 250 GENERAL PHARMACY W/O HCPCS: Performed by: PHYSICAL MEDICINE & REHABILITATION

## 2024-06-19 PROCEDURE — 7100000009 HC PHASE TWO TIME - INITIAL BASE CHARGE

## 2024-06-19 PROCEDURE — 3600000001 HC OR TIME - INITIAL BASE CHARGE - PROCEDURE LEVEL ONE

## 2024-06-19 PROCEDURE — 7100000010 HC PHASE TWO TIME - EACH INCREMENTAL 1 MINUTE

## 2024-06-19 RX ORDER — ROPIVACAINE HYDROCHLORIDE 5 MG/ML
INJECTION, SOLUTION EPIDURAL; INFILTRATION; PERINEURAL AS NEEDED
Status: COMPLETED | OUTPATIENT
Start: 2024-06-19 | End: 2024-06-19

## 2024-06-19 RX ORDER — LIDOCAINE HYDROCHLORIDE 5 MG/ML
INJECTION, SOLUTION INFILTRATION; INTRAVENOUS AS NEEDED
Status: COMPLETED | OUTPATIENT
Start: 2024-06-19 | End: 2024-06-19

## 2024-06-19 RX ORDER — METHYLPREDNISOLONE ACETATE 40 MG/ML
INJECTION, SUSPENSION INTRA-ARTICULAR; INTRALESIONAL; INTRAMUSCULAR; SOFT TISSUE AS NEEDED
Status: COMPLETED | OUTPATIENT
Start: 2024-06-19 | End: 2024-06-19

## 2024-06-19 ASSESSMENT — COLUMBIA-SUICIDE SEVERITY RATING SCALE - C-SSRS
2. HAVE YOU ACTUALLY HAD ANY THOUGHTS OF KILLING YOURSELF?: NO
6. HAVE YOU EVER DONE ANYTHING, STARTED TO DO ANYTHING, OR PREPARED TO DO ANYTHING TO END YOUR LIFE?: NO
1. IN THE PAST MONTH, HAVE YOU WISHED YOU WERE DEAD OR WISHED YOU COULD GO TO SLEEP AND NOT WAKE UP?: NO

## 2024-06-19 ASSESSMENT — ENCOUNTER SYMPTOMS
DEPRESSION: 0
LOSS OF SENSATION IN FEET: 0
OCCASIONAL FEELINGS OF UNSTEADINESS: 0

## 2024-06-19 ASSESSMENT — PAIN SCALES - GENERAL
PAINLEVEL_OUTOF10: 1
PAINLEVEL_OUTOF10: 4

## 2024-06-19 ASSESSMENT — PAIN DESCRIPTION - DESCRIPTORS: DESCRIPTORS: ACHING

## 2024-06-19 ASSESSMENT — PAIN - FUNCTIONAL ASSESSMENT
PAIN_FUNCTIONAL_ASSESSMENT: 0-10
PAIN_FUNCTIONAL_ASSESSMENT: 0-10

## 2024-06-19 NOTE — DISCHARGE INSTRUCTIONS
Discharge Instructions for Anesthesiology Pain Service Patients - Dr. Manuel    Observe the needle site for excessive bleeding (slow general oozing that completely soaks the dressing or fresh bright red bleeding).  In either case, apply pressure to the area, elevate it if possible and call your doctor at once.    Observe/monitor for the following signs and symptoms:         Needle site:  change in color, numbness or tingling, coldness to touch, swelling, drainage       Temperature of 101.5 or higher       Increased or uncontrollable pain    If you notice any of the above signs or symptoms, please call your doctor at once.    Your pain may not be gone immediately after this procedure; it generally takes 3 to 5 days for the steroid to work.    Keep the needle site clean and dry for 24 hours.    Continue your present medications.    No driving or operating machinery for 24 hours if you have received sedation.    Make an appointment to see you doctor in 2-3 weeks    Central Scheduling Number:  [phone]  Dr. Manuel's office:  [phone]  Dr. Manuel's line:  [phone]  After hours Pain Management:  [phone].    If any problems occur, or if you have further questions, please call you doctor as soon as possible.  If you find that you cannot reach your doctor, but feel that your condition needs a doctors attention, go to the  emergency room nearest your home.

## 2024-06-20 NOTE — ADDENDUM NOTE
Encounter addended by: Niya Rashid RN on: 6/20/2024 10:04 AM   Actions taken: Contacts section saved, Flowsheet accepted

## 2024-06-26 DIAGNOSIS — M54.16 LUMBAR NEURITIS: Primary | ICD-10-CM

## 2024-06-26 DIAGNOSIS — G58.8 NEURALGIA OF BOTH PUDENDAL NERVES: ICD-10-CM

## 2024-07-09 DIAGNOSIS — R10.2 CHRONIC PAIN IN FEMALE PELVIS: ICD-10-CM

## 2024-07-09 DIAGNOSIS — G89.29 CHRONIC PAIN IN FEMALE PELVIS: ICD-10-CM

## 2024-07-10 RX ORDER — GABAPENTIN 300 MG/1
600 CAPSULE ORAL 3 TIMES DAILY
Qty: 180 CAPSULE | Refills: 3 | Status: SHIPPED | OUTPATIENT
Start: 2024-07-10 | End: 2024-11-07

## 2024-07-11 ENCOUNTER — HOSPITAL ENCOUNTER (OUTPATIENT)
Dept: RADIOLOGY | Facility: CLINIC | Age: 49
Discharge: HOME | End: 2024-07-11
Payer: COMMERCIAL

## 2024-07-11 DIAGNOSIS — G58.8 NEURALGIA OF BOTH PUDENDAL NERVES: ICD-10-CM

## 2024-07-11 DIAGNOSIS — M54.16 LUMBAR NEURITIS: ICD-10-CM

## 2024-07-11 PROCEDURE — 72148 MRI LUMBAR SPINE W/O DYE: CPT

## 2024-07-31 ENCOUNTER — TELEPHONE (OUTPATIENT)
Dept: PAIN MEDICINE | Facility: HOSPITAL | Age: 49
End: 2024-07-31
Payer: COMMERCIAL

## 2024-07-31 NOTE — TELEPHONE ENCOUNTER
I called the patient this morning 7/31/24 and left her a message that the neurosurgeon sent Dr. Summers a message that it was ok for her to have her surgery today.                                                                      ----- Message from Nash Summers sent at 7/30/2024  3:42 PM EDT -----  Regarding: FW: MRI results  I finally heard back from the neurosurgeon about this patient, Cassandra would you be able to give her a quick call to let her know that he reviewed the MRI and did not feel anything on the MRI (specifically she had some Tarlov cysts) was contributing to her symptoms and would not recommend doing anything from a spine surgery standpoint  ----- Message -----  From: Grazyna Armas RN  Sent: 7/22/2024  12:19 PM EDT  To: Nash Summers MD  Subject: MRI results                                      Patient had her MRI. She is scheduled for pudental nerve decompression on 7/31 and just wanted to make sure there wasn't anything of significance to you from the MRI.

## 2024-07-31 NOTE — TELEPHONE ENCOUNTER
----- Message from Nash Manuel sent at 7/30/2024  3:42 PM EDT -----  Regarding: FW: MRI results  I finally heard back from the neurosurgeon about this patient, Cassandra would you be able to give her a quick call to let her know that he reviewed the MRI and did not feel anything on the MRI (specifically she had some Tarlov cysts) was contributing to her symptoms and would not recommend doing anything from a spine surgery standpoint  ----- Message -----  From: Grazyna Armas RN  Sent: 7/22/2024  12:19 PM EDT  To: Nash Manuel MD  Subject: MRI results                                      Patient had her MRI. She is scheduled for pudental nerve decompression on 7/31 and just wanted to make sure there wasn't anything of significance to you from the MRI.

## 2024-07-31 NOTE — TELEPHONE ENCOUNTER
I spoke with the patient's - patient was in surgery. He stated someone had already called and made her aware.

## 2024-09-11 DIAGNOSIS — G58.8 NEURALGIA OF BOTH PUDENDAL NERVES: ICD-10-CM

## 2024-09-11 DIAGNOSIS — Z79.899 LONG-TERM USE OF HIGH-RISK MEDICATION: ICD-10-CM

## 2024-09-11 RX ORDER — PREGABALIN 75 MG/1
75 CAPSULE ORAL 2 TIMES DAILY
Qty: 60 CAPSULE | Refills: 3 | Status: SHIPPED | OUTPATIENT
Start: 2024-09-11 | End: 2025-01-09

## 2024-09-11 NOTE — TELEPHONE ENCOUNTER
Patient calling for refill of Pregabalin. She is weaning off of Gabapentin and currently takes 75mg Pregabalin once daily. She is planning on adding the second 75mg pill soon. Will be going to [place] soon to visit son.

## 2024-10-29 ENCOUNTER — TELEPHONE (OUTPATIENT)
Dept: PAIN MEDICINE | Facility: CLINIC | Age: 49
End: 2024-10-29
Payer: COMMERCIAL

## 2024-12-09 DIAGNOSIS — G89.29 CHRONIC PAIN IN FEMALE PELVIS: ICD-10-CM

## 2024-12-09 DIAGNOSIS — R10.2 CHRONIC PAIN IN FEMALE PELVIS: ICD-10-CM

## 2024-12-09 RX ORDER — GABAPENTIN 300 MG/1
600 CAPSULE ORAL 3 TIMES DAILY
Qty: 180 CAPSULE | Refills: 3 | Status: SHIPPED | OUTPATIENT
Start: 2024-12-09 | End: 2025-04-08

## 2024-12-09 NOTE — TELEPHONE ENCOUNTER
Patient requesting Gabapentin refill, says Lyrica did not work so she would like to go back on the Gabapentin. Has [facility] scheduled 1/7/25 to discuss meds going forward.

## 2025-01-07 ENCOUNTER — APPOINTMENT (OUTPATIENT)
Dept: PAIN MEDICINE | Facility: CLINIC | Age: 50
End: 2025-01-07
Payer: COMMERCIAL

## 2025-01-07 VITALS — DIASTOLIC BLOOD PRESSURE: 65 MMHG | HEART RATE: 58 BPM | RESPIRATION RATE: 16 BRPM | SYSTOLIC BLOOD PRESSURE: 96 MMHG

## 2025-01-07 DIAGNOSIS — M54.16 LUMBAR NEURITIS: Primary | ICD-10-CM

## 2025-01-07 PROCEDURE — 99214 OFFICE O/P EST MOD 30 MIN: CPT | Performed by: PHYSICAL MEDICINE & REHABILITATION

## 2025-01-07 RX ORDER — DIAZEPAM 5 MG/1
5 TABLET ORAL ONCE AS NEEDED
OUTPATIENT
Start: 2025-01-07

## 2025-01-07 ASSESSMENT — ENCOUNTER SYMPTOMS
FEVER: 0
WEAKNESS: 0
BACK PAIN: 1
ACTIVITY CHANGE: 0
NUMBNESS: 0
JOINT SWELLING: 0
ARTHRALGIAS: 0
FATIGUE: 0
COLOR CHANGE: 0

## 2025-01-07 ASSESSMENT — PAIN SCALES - GENERAL: PAINLEVEL_OUTOF10: 7

## 2025-01-07 NOTE — PROGRESS NOTES
Subjective   Patient ID: Elle Dumas is a 49 y.o. female who presents for lower back pain down the left leg.  HPI    She is here as  a follow up after pudendal nerve decompression surgery was done in July of 2024 and this did not help all of her pain but did help her internal pelvic pain. We had done a couple pudendal nerve blocks in the past which has helped a little in the past.  Now she is having lower back pain on the left side with pain going down her left leg to her feet and now her leg will feel heavy at times. This will get worse with prolonged sitting with left leg crossed over and also knees straight. Gabapentin does help a little. She has been doing pelvic floor physical therapy which has been addressing core strength and this has been helping a little. She did have balance issues in the summer but this has since resolved. She does have some bowel incontinence which started after an episiotomy from her first child but she says it has gotten worse.       Review of Systems   Constitutional:  Negative for activity change, fatigue and fever.   Musculoskeletal:  Positive for back pain. Negative for arthralgias and joint swelling.   Skin:  Negative for color change and rash.   Neurological:  Negative for weakness and numbness.        Pain down left leg        Current Outpatient Medications   Medication Instructions    albuterol 90 mcg/actuation inhaler 1 puff, inhalation, Every 4 hours PRN    cyclobenzaprine (Flexeril) 10 mg tablet oral    cyclobenzaprine (FLEXERIL) 5 mg, oral, 2 times daily PRN    diazePAM (Valium) 2 mg tablet 1-2 tablets, 2 times daily PRN, BY MOUTH OR VAGINALL    gabapentin (NEURONTIN) 600 mg, oral, 3 times daily    HYDROcodone-acetaminophen (Norco) 5-325 mg tablet 1 tablet, oral, Every 6 hours    Lo Loestrin Fe 1 mg-10 mcg (24)/10 mcg (2) tablet 1 tablet, oral, Daily    methylPREDNISolone (Medrol Dospak) 4 mg tablets TAKE BY MOUTH AS DIRECTED    pregabalin (LYRICA) 75 mg, oral, 2 times  daily    progesterone (Prometrium) 100 mg capsule Progesterone 100 MG Oral Capsule   Quantity: 30  Refills: 0        Start : 24-May-2022   Active    spironolactone (ALDACTONE) 100 mg, oral, EVERY MORNING AND 1 TABLET EVERY EVENING    Vivelle-Dot 0.025 mg/24 hr patch 1 patch, transdermal, 2 times weekly        Past Medical History:   Diagnosis Date    Chronic pain disorder     Endometriosis     Low back pain         Past Surgical History:   Procedure Laterality Date    BI US GUIDED BREAST LOCALIZATION AND BIOPSY LEFT Left 07/06/2018    BI US GUIDED BREAST LOCALIZATION AND BIOPSY LEFT [facility]    TRIGGER POINT INJECTION          Family History   Problem Relation Name Age of Onset    Diabetes Father Morales     Stroke Father Morales     Depression Sister Kaela     Depression Son Hung     Mental illness Son Hung     Depression Son Anastacio     Mental illness Son Anastacio     Seizures Son Anastacio         Allergies   Allergen Reactions    Azithromycin Nausea And Vomiting    Ciprofloxacin Nausea And Vomiting    Adhesive Tape-Silicones Rash    Sulfamethoxazole-Trimethoprim Headache        MR lumbar spine wo IV contrast 07/11/2024    Narrative  Interpreted By:  Yaquelin Serrano,  STUDY:  MR LUMBAR SPINE WO IV CONTRAST;  7/11/2024 11:39 am    INDICATION:  Signs/Symptoms:Radicular pain.    COMPARISON:  None.    ACCESSION NUMBER(S):  [id number]    ORDERING CLINICIAN:  RUSS ARMSTRONG    TECHNIQUE:  Sagittal T1, T2, STIR, axial T1 and T2 weighted images of the lumbar  spine were acquired.    FINDINGS:  Alignment: There is very minimal, grade 1 retrolisthesis of L4 on L5.    Vertebrae/Intervertebral Discs: The vertebral bodies demonstrate  expected height.  The marrow signal is somewhat inhomogeneous  throughout on T1 and T2 weighted imaging. There is disc desiccation  primarily at L5-S1 and L4-5. There are mild Modic type 1 endplate  signal changes at L5-S1.    Conus: The lower thoracic cord appears unremarkable. The  conus  terminates at L2.    T12-L1:  There is no significant central canal or neural foraminal  stenosis. L1-2:  There is no significant central canal or neural  foraminal stenosis. L2-3:  There is mild degenerative facet  arthropathy, left greater than right without central canal or  neuroforaminal stenosis. L3-4: There is mild facet and ligamentum  flavum hypertrophy, left greater than right without central canal or  neuroforaminal stenosis. L4-5: Facet and ligamentum flavum  hypertrophy are noted, left greater than right. There is mild  circumferential disc bulging contributing to narrowing of the lateral  recesses. There is very mild overall narrowing of the spinal canal.  There is mild left and minimal right-sided neuroforaminal stenosis  due to asymmetric disc bulging on the left. Minimal linear  hyperintensity on T2 weighted imaging along the intraforaminal margin  of the disc on the left may represent an annular fissure. L5-S1:  Facet and ligamentum flavum hypertrophy and mild disc bulging are  noted. There is a tiny superimposed left paracentral disc protrusion  impressing on the ventral thecal sac. There is mild narrowing of the  central canal and at most minimal neuroforaminal encroachment on the  right. Subtle hyperintensity on T2 weighted imaging along the  posterior margin of the disc may represent an annular fissure.    There are Tarlov cysts at the S2 level, left greater than right.    The prevertebral and posterior paraspinous soft tissues are  unremarkable.    Impression  Mild degenerative changes of the lumbar spine.    MACRO:  None    Signed by: Yaquelin Serrano 7/11/2024 12:04 PM  Dictation workstation:   [id number]      Objective     Vitals:    01/07/25 1147   BP: 96/65   Pulse: 58   Resp: 16      Pain Score:   7        Physical Exam    GENERAL EXAM  Vital Signs: Vital signs to include heart rate, respiration rate, blood pressure, and temperature were reviewed.  General Appearance:  Awake,  alert, healthy appearing, well developed, No acute distress.  Head: Normocephalic without evidence of head injury.  Neck: The appearance of the neck was normal without swelling with a midline trachea.  Eyes: The eyelids and eyebrows exhibited no abnormalities.  Pupils were not pin-point.  Sclera was without icterus.  Lungs: Respiration rhythm and depth was normal.  Respiratory movements were normal without labored breathing.  Cardiovascular: No peripheral edema was present.    Neurological: Patient was oriented to time, place, and person.  Speech was normal.  Balance, gait, and stance were unremarkable.    Psychiatric: Appearance was normal with appropriate dress.  Mood was euthymic and affect was normal.  Skin: Affected regions were without ecchymosis or skin lesions.      Physical exam as above except:  Lumbar extension within flexion and extension.  5/5 strength, sensation intact light touch, and reflexes 2+ in bilateral lower extremities.  No clonus.  Positive slump test and straight leg raise on the left.  Tenderness to palpation lumbar paraspinals at the L4, L5, and S1 area bilaterally.      Assessment/Plan   Diagnoses and all orders for this visit:  Lumbar neuritis  -     FL pain management; Future  -     Epidural Steroid Injection; Future  Other orders  -     NPO Diet Except: Sips with meds; Effective now; Standing  -     Height and weight; Standing  -     Insert and maintain peripheral IV; Standing  -     Saline lock IV; Standing  -     POCT pregnancy, urine; Standing  -     Type And Screen; Standing  -     diazePAM (Valium) tablet 5 mg  -     Adult diet Regular; Standing  -     Vital Signs; Standing  -     Notify physician - Standard Parameters; Standing  -     Continue IV fluids ordered pre-procedure; Standing  -     Prior to Discharge O2 Weaning; Standing  -     Pulse oximetry, continuous; Standing  -     Discharge patient; Standing    Elle Dumas is a 49 y.o. female who presents for lower back pain  down the left leg.  MRI of the lumbar spine from 7/29/2024 was personally reviewed showing bilateral neuroforaminal narrowing at the L5/S1 level with worse on the left.  There is also annular tearing at the L4-5 and L5-S1 levels.  At the L4-5 level as there is some lateral recess stenosis likely contacting the L5 nerve roots as well.  Physical exam is reassuring for lack of neurologic compromise show positive straight leg raise and slump test on the left.  Exam and symptoms indicate lumbar radiculopathy likely at the L5 level.  We discussed doing an L5-S1 interlaminar epidural steroid injection under fluoroscopic guidance.  Discussed benefits risk of the procedure and she wishes to proceed.  She should continue doing her physical therapy exercises.    Plan:  -Schedule L5-S1 interlaminar epidural steroid injection fluoroscopic guidance.  Discussed benefits risks of the procedure and she wishes to proceed.  -Continue gabapentin  -Continue home exercises from physical therapy  -Follow-up after procedure, we could consider more targeted transforaminal epidural steroid injection in the future as well.    Zak Gaming DO, ATC  Physical Medicine & Rehabilitation PGY-4     This note was generated with the aid of dictation software, there may be typos despite my attempts at proofreading.     I saw and evaluated the patient. I personally obtained the key and critical portions of the history and physical exam or was physically present for key and critical portions performed by the resident/fellow. I reviewed the resident/fellow's documentation and discussed the patient with the resident/fellow. I agree with the resident/fellow's medical decision making as documented in the note.    Nash Manuel MD

## 2025-01-21 NOTE — H&P
Pain Management H&P    History Of Present Illness  Elle Dumas is a 49 y.o. female presents for procedure state below. Endorses no changes in past medical history or medical health since last seen in clinic.      Past Medical History  She has a past medical history of Chronic pain disorder, Endometriosis, and Low back pain.    Surgical History  She has a past surgical history that includes BI US guided breast localization and biopsy left (Left, 07/06/2018) and Trigger point injection.     Social History  She reports that she has never smoked. She has never used smokeless tobacco. She reports that she does not drink alcohol and does not use drugs.    Family History  Family History   Problem Relation Name Age of Onset    Diabetes Father Morales     Stroke Father Morales     Depression Sister Kaela     Depression Son Hung     Mental illness Son Hung     Depression Son Anastacio     Mental illness Son Anastacio     Seizures Son Anastacio         Allergies  Azithromycin, Ciprofloxacin, Adhesive tape-silicones, and Sulfamethoxazole-trimethoprim    Review of Symptoms:   Constitutional: Negative for chills, diaphoresis or fever  HENT: Negative for neck swelling  Eyes:.  Negative for eye pain  Respiratory:.  Negative for cough, shortness of breath or wheezing    Cardiovascular:.  Negative for chest pain or palpitations  Gastrointestinal:.  Negative for abdominal pain, nausea and vomiting  Genitourinary:.  Negative for urgency  Musculoskeletal:  Positive for back pain. Positive for joint pain. Denies falls within the past 3 months.  Skin: Negative for wounds or itching   Neurological: Negative for dizziness, seizures, loss of consciousness and weakness  Endo/Heme/Allergies: Does not bruise/bleed easily  Psychiatric/Behavioral: Negative for depression. The patient does not appear anxious.      Pre-sedation Evaluation  ASA class 2  Mallampati score 2     PHYSICAL EXAM  Vitals signs reviewed  Constitutional:       General: Not in acute  distress     Appearance: Normal appearance. Not ill-appearing.  HENT:     Head: Normocephalic and atraumatic  Eyes:     Conjunctiva/sclera: Conjunctivae normal  Cardiovascular:     Rate and Rhythm: Normal rate and regular rhythm  Pulmonary:     Effort: No respiratory distress  Abdominal:     Palpations: Abdomen is soft  Musculoskeletal: SHIPMAN  Skin:     General: Skin is warm and dry  Neurological:     General: No focal deficit present  Psychiatric:         Mood and Affect: Mood normal         Behavior: Behavior normal     Last Recorded Vitals  There were no vitals taken for this visit.    Relevant Results  Current Outpatient Medications   Medication Instructions    albuterol 90 mcg/actuation inhaler 1 puff, inhalation, Every 4 hours PRN    cyclobenzaprine (Flexeril) 10 mg tablet oral    cyclobenzaprine (FLEXERIL) 5 mg, oral, 2 times daily PRN    diazePAM (Valium) 2 mg tablet 1-2 tablets, 2 times daily PRN, BY MOUTH OR VAGINALL    gabapentin (NEURONTIN) 600 mg, oral, 3 times daily    HYDROcodone-acetaminophen (Norco) 5-325 mg tablet 1 tablet, oral, Every 6 hours    Lo Loestrin Fe 1 mg-10 mcg (24)/10 mcg (2) tablet 1 tablet, oral, Daily    methylPREDNISolone (Medrol Dospak) 4 mg tablets TAKE BY MOUTH AS DIRECTED    pregabalin (LYRICA) 75 mg, oral, 2 times daily    progesterone (Prometrium) 100 mg capsule Progesterone 100 MG Oral Capsule   Quantity: 30  Refills: 0        Start : 24-May-2022   Active    spironolactone (ALDACTONE) 100 mg, oral, EVERY MORNING AND 1 TABLET EVERY EVENING    Vivelle-Dot 0.025 mg/24 hr patch 1 patch, transdermal, 2 times weekly         MR lumbar spine wo IV contrast 07/11/2024    Narrative  Interpreted By:  Yaquelin Serrano,  STUDY:  MR LUMBAR SPINE WO IV CONTRAST;  7/11/2024 11:39 am    INDICATION:  Signs/Symptoms:Radicular pain.    COMPARISON:  None.    ACCESSION NUMBER(S):  [id number]    ORDERING CLINICIAN:  RUSS ARMSTRONG    TECHNIQUE:  Sagittal T1, T2, STIR, axial T1 and T2 weighted  images of the lumbar  spine were acquired.    FINDINGS:  Alignment: There is very minimal, grade 1 retrolisthesis of L4 on L5.    Vertebrae/Intervertebral Discs: The vertebral bodies demonstrate  expected height.  The marrow signal is somewhat inhomogeneous  throughout on T1 and T2 weighted imaging. There is disc desiccation  primarily at L5-S1 and L4-5. There are mild Modic type 1 endplate  signal changes at L5-S1.    Conus: The lower thoracic cord appears unremarkable. The conus  terminates at L2.    T12-L1:  There is no significant central canal or neural foraminal  stenosis. L1-2:  There is no significant central canal or neural  foraminal stenosis. L2-3:  There is mild degenerative facet  arthropathy, left greater than right without central canal or  neuroforaminal stenosis. L3-4: There is mild facet and ligamentum  flavum hypertrophy, left greater than right without central canal or  neuroforaminal stenosis. L4-5: Facet and ligamentum flavum  hypertrophy are noted, left greater than right. There is mild  circumferential disc bulging contributing to narrowing of the lateral  recesses. There is very mild overall narrowing of the spinal canal.  There is mild left and minimal right-sided neuroforaminal stenosis  due to asymmetric disc bulging on the left. Minimal linear  hyperintensity on T2 weighted imaging along the intraforaminal margin  of the disc on the left may represent an annular fissure. L5-S1:  Facet and ligamentum flavum hypertrophy and mild disc bulging are  noted. There is a tiny superimposed left paracentral disc protrusion  impressing on the ventral thecal sac. There is mild narrowing of the  central canal and at most minimal neuroforaminal encroachment on the  right. Subtle hyperintensity on T2 weighted imaging along the  posterior margin of the disc may represent an annular fissure.    There are Tarlov cysts at the S2 level, left greater than right.    The prevertebral and posterior paraspinous  soft tissues are  unremarkable.    Impression  Mild degenerative changes of the lumbar spine.    MACRO:  None    Signed by: Yaquelin Serrano 7/11/2024 12:04 PM  Dictation workstation:   [id number]     No image results found.       No diagnosis found.     ASSESSMENT/PLAN  Elle Dumas is a 49 y.o. female here for L5-S1 interlaminar epidural steroid injection under fluoroscopy    Patient denies any recent antibiotic use or infections, denies any blood thinner use, and denies contrast or local anesthetic allergies     Risks, benefits, alternatives discussed. All questions answered to the best of my ability. Patient agrees to proceed.      Our plan is as follows:  - Proceed with aforementioned procedure          Fredy Sellers MD   Pain fellow

## 2025-01-22 ENCOUNTER — HOSPITAL ENCOUNTER (OUTPATIENT)
Dept: OPERATING ROOM | Facility: CLINIC | Age: 50
Setting detail: OUTPATIENT SURGERY
Discharge: HOME | End: 2025-01-22
Payer: COMMERCIAL

## 2025-01-22 VITALS
OXYGEN SATURATION: 100 % | DIASTOLIC BLOOD PRESSURE: 72 MMHG | TEMPERATURE: 98.6 F | SYSTOLIC BLOOD PRESSURE: 122 MMHG | WEIGHT: 135.58 LBS | RESPIRATION RATE: 16 BRPM | HEART RATE: 68 BPM | HEIGHT: 63 IN | BODY MASS INDEX: 24.02 KG/M2

## 2025-01-22 DIAGNOSIS — M54.16 LUMBAR NEURITIS: ICD-10-CM

## 2025-01-22 PROCEDURE — 62323 NJX INTERLAMINAR LMBR/SAC: CPT | Performed by: PHYSICAL MEDICINE & REHABILITATION

## 2025-01-22 PROCEDURE — 2500000004 HC RX 250 GENERAL PHARMACY W/ HCPCS (ALT 636 FOR OP/ED): Performed by: PHYSICAL MEDICINE & REHABILITATION

## 2025-01-22 PROCEDURE — 3600000001 HC OR TIME - INITIAL BASE CHARGE - PROCEDURE LEVEL ONE

## 2025-01-22 PROCEDURE — 3600000006 HC OR TIME - EACH INCREMENTAL 1 MINUTE - PROCEDURE LEVEL ONE

## 2025-01-22 PROCEDURE — 2500000002 HC RX 250 W HCPCS SELF ADMINISTERED DRUGS (ALT 637 FOR MEDICARE OP, ALT 636 FOR OP/ED): Performed by: PHYSICAL MEDICINE & REHABILITATION

## 2025-01-22 PROCEDURE — 2550000001 HC RX 255 CONTRASTS: Performed by: PHYSICAL MEDICINE & REHABILITATION

## 2025-01-22 PROCEDURE — 7100000009 HC PHASE TWO TIME - INITIAL BASE CHARGE

## 2025-01-22 PROCEDURE — 7100000010 HC PHASE TWO TIME - EACH INCREMENTAL 1 MINUTE

## 2025-01-22 RX ORDER — DEXAMETHASONE SODIUM PHOSPHATE 10 MG/ML
INJECTION INTRAMUSCULAR; INTRAVENOUS AS NEEDED
Status: COMPLETED | OUTPATIENT
Start: 2025-01-22 | End: 2025-01-22

## 2025-01-22 RX ORDER — DIAZEPAM 5 MG/1
5 TABLET ORAL ONCE AS NEEDED
Status: COMPLETED | OUTPATIENT
Start: 2025-01-22 | End: 2025-01-22

## 2025-01-22 RX ORDER — LIDOCAINE HYDROCHLORIDE 5 MG/ML
INJECTION, SOLUTION INFILTRATION; INTRAVENOUS AS NEEDED
Status: COMPLETED | OUTPATIENT
Start: 2025-01-22 | End: 2025-01-22

## 2025-01-22 RX ADMIN — DEXAMETHASONE SODIUM PHOSPHATE 10 MG: 10 INJECTION, SOLUTION INTRAMUSCULAR; INTRAVENOUS at 12:37

## 2025-01-22 RX ADMIN — LIDOCAINE HYDROCHLORIDE 5 ML: 5 INJECTION, SOLUTION INFILTRATION at 12:33

## 2025-01-22 RX ADMIN — DIAZEPAM 5 MG: 5 TABLET ORAL at 11:54

## 2025-01-22 RX ADMIN — IOHEXOL 5 ML: 240 INJECTION, SOLUTION INTRATHECAL; INTRAVASCULAR; INTRAVENOUS; ORAL at 12:37

## 2025-01-22 ASSESSMENT — ENCOUNTER SYMPTOMS
DEPRESSION: 0
OCCASIONAL FEELINGS OF UNSTEADINESS: 0
LOSS OF SENSATION IN FEET: 0

## 2025-01-22 ASSESSMENT — COLUMBIA-SUICIDE SEVERITY RATING SCALE - C-SSRS
6. HAVE YOU EVER DONE ANYTHING, STARTED TO DO ANYTHING, OR PREPARED TO DO ANYTHING TO END YOUR LIFE?: NO
2. HAVE YOU ACTUALLY HAD ANY THOUGHTS OF KILLING YOURSELF?: NO
1. IN THE PAST MONTH, HAVE YOU WISHED YOU WERE DEAD OR WISHED YOU COULD GO TO SLEEP AND NOT WAKE UP?: NO

## 2025-01-22 ASSESSMENT — PAIN - FUNCTIONAL ASSESSMENT
PAIN_FUNCTIONAL_ASSESSMENT: 0-10

## 2025-01-22 ASSESSMENT — PATIENT HEALTH QUESTIONNAIRE - PHQ9
SUM OF ALL RESPONSES TO PHQ9 QUESTIONS 1 AND 2: 0
1. LITTLE INTEREST OR PLEASURE IN DOING THINGS: NOT AT ALL
2. FEELING DOWN, DEPRESSED OR HOPELESS: NOT AT ALL

## 2025-01-22 ASSESSMENT — PAIN SCALES - GENERAL
PAINLEVEL_OUTOF10: 0 - NO PAIN
PAINLEVEL_OUTOF10: 0 - NO PAIN
PAINLEVEL_OUTOF10: 5 - MODERATE PAIN

## 2025-01-22 NOTE — DISCHARGE INSTRUCTIONS
Discharge Instructions for Anesthesiology Pain Service Patients - Dr. Manuel    Observe the needle site for excessive bleeding (slow general oozing that completely soaks the dressing or fresh bright red bleeding).  In either case, apply pressure to the area, elevate it if possible and call your doctor at once.    Observe/monitor for the following signs and symptoms:         Needle site:  change in color, numbness or tingling, coldness to touch, swelling, drainage       Temperature of 101.5 or higher       Increased or uncontrollable pain    If you notice any of the above signs or symptoms, please call your doctor at once.    Your pain may not be gone immediately after this procedure; it generally takes 3 to 5 days for the steroid to work.    Keep the needle site clean and dry for 24 hours.    Continue your present medications.    No driving or operating machinery for 24 hours if you have received sedation.    Make an appointment to see you doctor in 2-3 weeks    Central Scheduling Number:  [phone]  Dr. Manuel's office:  [phone]  Dr. Manuel's Nurse line:  [phone]  After hours Pain Management:  [phone].    If any problems occur, or if you have further questions, please call you doctor as soon as possible.  If you find that you cannot reach your doctor, but feel that your condition needs a doctors attention, go to the  emergency room nearest your home.

## 2025-01-23 NOTE — ADDENDUM NOTE
Encounter addended by: Lissa San RN on: 1/23/2025 9:02 AM   Actions taken: Flowsheet accepted, Contacts section saved

## 2025-03-04 ENCOUNTER — APPOINTMENT (OUTPATIENT)
Dept: PAIN MEDICINE | Facility: CLINIC | Age: 50
End: 2025-03-04
Payer: COMMERCIAL

## 2025-03-04 VITALS — RESPIRATION RATE: 16 BRPM | HEART RATE: 73 BPM | DIASTOLIC BLOOD PRESSURE: 69 MMHG | SYSTOLIC BLOOD PRESSURE: 103 MMHG

## 2025-03-04 DIAGNOSIS — M54.16 LUMBAR NEURITIS: Primary | ICD-10-CM

## 2025-03-04 PROCEDURE — 99214 OFFICE O/P EST MOD 30 MIN: CPT | Performed by: PHYSICAL MEDICINE & REHABILITATION

## 2025-03-04 RX ORDER — DIAZEPAM 5 MG/1
5 TABLET ORAL ONCE AS NEEDED
OUTPATIENT
Start: 2025-03-04

## 2025-03-04 ASSESSMENT — PAIN SCALES - GENERAL: PAINLEVEL_OUTOF10: 8

## 2025-03-04 NOTE — PROGRESS NOTES
Subjective   Patient ID: Elle Dumas is a 49 y.o. female who presents for Follow-up.  HPI    49-year-old female with left lower extremity radicular symptoms and lower back pain.  Patient is following up today after initial L5-S1 interlaminar epidural steroid injection.  Patient reports near complete relief for about a week but her pain has returned to baseline.  Symptoms are otherwise similar to previous evaluation worse with sitting for long periods of time and with radiation down her left leg into her butt cheek and thigh and leg in the back of her leg.                  Monitoring and compliance:    ORT:    PDUQ:    Office Agreement:      Review of Systems     Current Outpatient Medications   Medication Instructions    cyclobenzaprine (Flexeril) 10 mg tablet oral    cyclobenzaprine (FLEXERIL) 5 mg, 2 times daily PRN    diazePAM (Valium) 2 mg tablet 1-2 tablets, 2 times daily PRN    gabapentin (NEURONTIN) 600 mg, oral, 3 times daily    pregabalin (LYRICA) 75 mg, oral, 2 times daily        Past Medical History:   Diagnosis Date    Chronic pain disorder     Endometriosis     Low back pain         Past Surgical History:   Procedure Laterality Date    BI US GUIDED BREAST LOCALIZATION AND BIOPSY LEFT Left 07/06/2018    BI US GUIDED BREAST LOCALIZATION AND BIOPSY LEFT [facility]    TRIGGER POINT INJECTION          Family History   Problem Relation Name Age of Onset    Diabetes Father Morales     Stroke Father Morales     Depression Sister Kaela     Depression Son Hung     Mental illness Son Hung     Depression Son Anastacio     Mental illness Son Anastacio     Seizures Son Anastacio         Allergies   Allergen Reactions    Azithromycin Nausea And Vomiting    Ciprofloxacin Nausea And Vomiting    Adhesive Tape-Silicones Rash    Sulfamethoxazole-Trimethoprim Headache        MR lumbar spine wo IV contrast 07/11/2024    Narrative  Interpreted By:  Yaquelin Serrano,  STUDY:  MR LUMBAR SPINE WO IV CONTRAST;  7/11/2024 11:39  am    INDICATION:  Signs/Symptoms:Radicular pain.    COMPARISON:  None.    ACCESSION NUMBER(S):  [id number]    ORDERING CLINICIAN:  RUSS ARMSTRONG    TECHNIQUE:  Sagittal T1, T2, STIR, axial T1 and T2 weighted images of the lumbar  spine were acquired.    FINDINGS:  Alignment: There is very minimal, grade 1 retrolisthesis of L4 on L5.    Vertebrae/Intervertebral Discs: The vertebral bodies demonstrate  expected height.  The marrow signal is somewhat inhomogeneous  throughout on T1 and T2 weighted imaging. There is disc desiccation  primarily at L5-S1 and L4-5. There are mild Modic type 1 endplate  signal changes at L5-S1.    Conus: The lower thoracic cord appears unremarkable. The conus  terminates at L2.    T12-L1:  There is no significant central canal or neural foraminal  stenosis. L1-2:  There is no significant central canal or neural  foraminal stenosis. L2-3:  There is mild degenerative facet  arthropathy, left greater than right without central canal or  neuroforaminal stenosis. L3-4: There is mild facet and ligamentum  flavum hypertrophy, left greater than right without central canal or  neuroforaminal stenosis. L4-5: Facet and ligamentum flavum  hypertrophy are noted, left greater than right. There is mild  circumferential disc bulging contributing to narrowing of the lateral  recesses. There is very mild overall narrowing of the spinal canal.  There is mild left and minimal right-sided neuroforaminal stenosis  due to asymmetric disc bulging on the left. Minimal linear  hyperintensity on T2 weighted imaging along the intraforaminal margin  of the disc on the left may represent an annular fissure. L5-S1:  Facet and ligamentum flavum hypertrophy and mild disc bulging are  noted. There is a tiny superimposed left paracentral disc protrusion  impressing on the ventral thecal sac. There is mild narrowing of the  central canal and at most minimal neuroforaminal encroachment on the  right. Subtle hyperintensity  on T2 weighted imaging along the  posterior margin of the disc may represent an annular fissure.    There are Tarlov cysts at the S2 level, left greater than right.    The prevertebral and posterior paraspinous soft tissues are  unremarkable.    Impression  Mild degenerative changes of the lumbar spine.    MACRO:  None    Signed by: Yaquelin Serrano 7/11/2024 12:04 PM  Dictation workstation:   [id number]      Objective     Vitals:    03/04/25 0843   BP: 103/69   Pulse: 73   Resp: 16      Pain Score:   8        Physical Exam    GENERAL EXAM  Vital Signs: Vital signs to include heart rate, respiration rate, blood pressure, and temperature were reviewed.  General Appearance:  Awake, alert, healthy appearing, well developed, No acute distress.  Head: Normocephalic without evidence of head injury.  Neck: The appearance of the neck was normal without swelling with a midline trachea.  Eyes: The eyelids and eyebrows exhibited no abnormalities.  Pupils were not pin-point.  Sclera was without icterus.  Lungs: Respiration rhythm and depth was normal.  Respiratory movements were normal without labored breathing.  Cardiovascular: No peripheral edema was present.    Neurological: Patient was oriented to time, place, and person.  Speech was normal.  Balance, gait, and stance were unremarkable.    Psychiatric: Appearance was normal with appropriate dress.  Mood was euthymic and affect was normal.  Skin: Affected regions were without ecchymosis or skin lesions.        Physical exam as above except:    Positive straight leg raise        Assessment/Plan   Diagnoses and all orders for this visit:  Lumbar neuritis  -     FL pain management; Future  -     Transforaminal; Future  Other orders  -     NPO Diet Except: Sips with meds; Effective now; Standing  -     Height and weight; Standing  -     Insert and maintain peripheral IV; Standing  -     Saline lock IV; Standing  -     POCT pregnancy, urine; Standing  -     diazePAM (Valium)  tablet 5 mg  -     Adult diet Regular; Standing  -     Vital Signs; Standing  -     Notify physician - Standard Parameters; Standing  -     Continue IV fluids ordered pre-procedure; Standing  -     Prior to Discharge O2 Weaning; Standing  -     Pulse oximetry, continuous; Standing  -     Discharge patient; Standing    49-year-old female with lower back pain with left lower extremity radicular symptoms.    Medical necessity: The patient is presenting primarily with Lumbar pain and radicular symptoms.  The pain is constant and of moderate severity that interferes with activities of daily living and sleep. The patient failed conservative therapy to include Tylenol/NSAIDS and physical therapy/supervised home exercise program and continues to participate in their supervised home exercise program.  Lumbar spine MRI which I personally reviewed showed annular tearing at L4-5 and L5-S1 with some lateral recess stenosis contacting the L5 nerve root on the left..  The patient has previously undergone a Lumbar Intralaminar Epidural Steroid Injection at L5-S1 inadequate pain relief.  Based on these results will plan to switch to a different approach.  Will proceed with Lumbar Transforaminal Epidural Steroid Injection at Left L4-5 and L5-S1 with fluoroscopy.  We discussed the risks, benefits and alternatives to the procedure(s) and the patient would like to proceed.  This procedure is part of a comprehensive and multimodal treatment plan to facility physical therapy and a supervised home exercise program.    Plan:  - Left L4 and L5 transforaminal epidural steroid injection.  - Continue with gabapentin.  - Continue home exercise program.       This note was generated with the aid of dictation software, there may be typos despite my attempts at proofreading.

## 2025-03-04 NOTE — H&P (VIEW-ONLY)
Subjective   Patient ID: Elle Dumas is a 49 y.o. female who presents for Follow-up.  HPI    49-year-old female with left lower extremity radicular symptoms and lower back pain.  Patient is following up today after initial L5-S1 interlaminar epidural steroid injection.  Patient reports near complete relief for about a week but her pain has returned to baseline.  Symptoms are otherwise similar to previous evaluation worse with sitting for long periods of time and with radiation down her left leg into her butt cheek and thigh and leg in the back of her leg.                  Monitoring and compliance:    ORT:    PDUQ:    Office Agreement:      Review of Systems     Current Outpatient Medications   Medication Instructions    cyclobenzaprine (Flexeril) 10 mg tablet oral    cyclobenzaprine (FLEXERIL) 5 mg, 2 times daily PRN    diazePAM (Valium) 2 mg tablet 1-2 tablets, 2 times daily PRN    gabapentin (NEURONTIN) 600 mg, oral, 3 times daily    pregabalin (LYRICA) 75 mg, oral, 2 times daily        Past Medical History:   Diagnosis Date    Chronic pain disorder     Endometriosis     Low back pain         Past Surgical History:   Procedure Laterality Date    BI US GUIDED BREAST LOCALIZATION AND BIOPSY LEFT Left 07/06/2018    BI US GUIDED BREAST LOCALIZATION AND BIOPSY LEFT [facility]    [facility] INJECTION          Family History   Problem Relation Name Age of Onset    Diabetes Father Morales     Stroke Father Morales     Depression Sister Kaela     Depression Son Hung     Mental illness Son Hung     Depression Son Anastacio     Mental illness Son Anastacio     Seizures Son Anastacio         Allergies   Allergen Reactions    Azithromycin Nausea And Vomiting    Ciprofloxacin Nausea And Vomiting    Adhesive Tape-Silicones Rash    Sulfamethoxazole-Trimethoprim Headache        MR lumbar spine wo IV contrast 07/11/2024    Narrative  Interpreted By:  Yaquelin Serrano,  STUDY:  MR LUMBAR SPINE WO IV CONTRAST;  7/11/2024 11:39  am    INDICATION:  Signs/Symptoms:Radicular pain.    COMPARISON:  None.    ACCESSION NUMBER(S):  [id number]    ORDERING CLINICIAN:  RUSS ARMSTRONG    TECHNIQUE:  Sagittal T1, T2, STIR, axial T1 and T2 weighted images of the lumbar  spine were acquired.    FINDINGS:  Alignment: There is very minimal, grade 1 retrolisthesis of L4 on L5.    Vertebrae/Intervertebral Discs: The vertebral bodies demonstrate  expected height.  The marrow signal is somewhat inhomogeneous  throughout on T1 and T2 weighted imaging. There is disc desiccation  primarily at L5-S1 and L4-5. There are mild Modic type 1 endplate  signal changes at L5-S1.    Conus: The lower thoracic cord appears unremarkable. The conus  terminates at L2.    T12-L1:  There is no significant central canal or neural foraminal  stenosis. L1-2:  There is no significant central canal or neural  foraminal stenosis. L2-3:  There is mild degenerative facet  arthropathy, left greater than right without central canal or  neuroforaminal stenosis. L3-4: There is mild facet and ligamentum  flavum hypertrophy, left greater than right without central canal or  neuroforaminal stenosis. L4-5: Facet and ligamentum flavum  hypertrophy are noted, left greater than right. There is mild  circumferential disc bulging contributing to narrowing of the lateral  recesses. There is very mild overall narrowing of the spinal canal.  There is mild left and minimal right-sided neuroforaminal stenosis  due to asymmetric disc bulging on the left. Minimal linear  hyperintensity on T2 weighted imaging along the intraforaminal margin  of the disc on the left may represent an annular fissure. L5-S1:  Facet and ligamentum flavum hypertrophy and mild disc bulging are  noted. There is a tiny superimposed left paracentral disc protrusion  impressing on the ventral thecal sac. There is mild narrowing of the  central canal and at most minimal neuroforaminal encroachment on the  right. Subtle hyperintensity  on T2 weighted imaging along the  posterior margin of the disc may represent an annular fissure.    There are Tarlov cysts at the S2 level, left greater than right.    The prevertebral and posterior paraspinous soft tissues are  unremarkable.    Impression  Mild degenerative changes of the lumbar spine.    MACRO:  None    Signed by: Yaquelin Serrano 7/11/2024 12:04 PM  Dictation workstation:   [id number]      Objective     Vitals:    03/04/25 0843   BP: 103/69   Pulse: 73   Resp: 16      Pain Score:   8        Physical Exam    GENERAL EXAM  Vital Signs: Vital signs to include heart rate, respiration rate, blood pressure, and temperature were reviewed.  General Appearance:  Awake, alert, healthy appearing, well developed, No acute distress.  Head: Normocephalic without evidence of head injury.  Neck: The appearance of the neck was normal without swelling with a midline trachea.  Eyes: The eyelids and eyebrows exhibited no abnormalities.  Pupils were not pin-point.  Sclera was without icterus.  Lungs: Respiration rhythm and depth was normal.  Respiratory movements were normal without labored breathing.  Cardiovascular: No peripheral edema was present.    Neurological: Patient was oriented to time, place, and person.  Speech was normal.  Balance, gait, and stance were unremarkable.    Psychiatric: Appearance was normal with appropriate dress.  Mood was euthymic and affect was normal.  Skin: Affected regions were without ecchymosis or skin lesions.        Physical exam as above except:    Positive straight leg raise        Assessment/Plan   Diagnoses and all orders for this visit:  Lumbar neuritis  -     FL pain management; Future  -     Transforaminal; Future  Other orders  -     NPO Diet Except: Sips with meds; Effective now; Standing  -     Height and weight; Standing  -     Insert and maintain peripheral IV; Standing  -     Saline lock IV; Standing  -     POCT pregnancy, urine; Standing  -     diazePAM (Valium)  tablet 5 mg  -     Adult diet Regular; Standing  -     Vital Signs; Standing  -     Notify physician - Standard Parameters; Standing  -     Continue IV fluids ordered pre-procedure; Standing  -     Prior to Discharge O2 Weaning; Standing  -     Pulse oximetry, continuous; Standing  -     Discharge patient; Standing    49-year-old female with lower back pain with left lower extremity radicular symptoms.    Medical necessity: The patient is presenting primarily with Lumbar pain and radicular symptoms.  The pain is constant and of moderate severity that interferes with activities of daily living and sleep. The patient failed conservative therapy to include Tylenol/NSAIDS and physical therapy/supervised home exercise program and continues to participate in their supervised home exercise program.  Lumbar spine MRI which I personally reviewed showed annular tearing at L4-5 and L5-S1 with some lateral recess stenosis contacting the L5 nerve root on the left..  The patient has previously undergone a Lumbar Intralaminar Epidural Steroid Injection at L5-S1 inadequate pain relief.  Based on these results will plan to switch to a different approach.  Will proceed with Lumbar Transforaminal Epidural Steroid Injection at Left L4-5 and L5-S1 with fluoroscopy.  We discussed the risks, benefits and alternatives to the procedure(s) and the patient would like to proceed.  This procedure is part of a comprehensive and multimodal treatment plan to facility physical therapy and a supervised home exercise program.    Plan:  - Left L4 and L5 transforaminal epidural steroid injection.  - Continue with gabapentin.  - Continue home exercise program.       This note was generated with the aid of dictation software, there may be typos despite my attempts at proofreading.

## 2025-03-19 ENCOUNTER — HOSPITAL ENCOUNTER (OUTPATIENT)
Dept: OPERATING ROOM | Facility: CLINIC | Age: 50
Setting detail: OUTPATIENT SURGERY
Discharge: HOME | End: 2025-03-19
Payer: COMMERCIAL

## 2025-03-19 VITALS
RESPIRATION RATE: 16 BRPM | SYSTOLIC BLOOD PRESSURE: 118 MMHG | HEIGHT: 63 IN | BODY MASS INDEX: 24.14 KG/M2 | WEIGHT: 136.24 LBS | TEMPERATURE: 98.8 F | OXYGEN SATURATION: 97 % | HEART RATE: 66 BPM | DIASTOLIC BLOOD PRESSURE: 73 MMHG

## 2025-03-19 DIAGNOSIS — M54.16 LUMBAR NEURITIS: ICD-10-CM

## 2025-03-19 PROCEDURE — 3600000001 HC OR TIME - INITIAL BASE CHARGE - PROCEDURE LEVEL ONE

## 2025-03-19 PROCEDURE — 2500000004 HC RX 250 GENERAL PHARMACY W/ HCPCS (ALT 636 FOR OP/ED): Performed by: PHYSICAL MEDICINE & REHABILITATION

## 2025-03-19 PROCEDURE — 3600000006 HC OR TIME - EACH INCREMENTAL 1 MINUTE - PROCEDURE LEVEL ONE

## 2025-03-19 PROCEDURE — 64484 NJX AA&/STRD TFRM EPI L/S EA: CPT | Performed by: PHYSICAL MEDICINE & REHABILITATION

## 2025-03-19 PROCEDURE — 7100000010 HC PHASE TWO TIME - EACH INCREMENTAL 1 MINUTE

## 2025-03-19 PROCEDURE — 2550000001 HC RX 255 CONTRASTS: Performed by: PHYSICAL MEDICINE & REHABILITATION

## 2025-03-19 PROCEDURE — 7100000009 HC PHASE TWO TIME - INITIAL BASE CHARGE

## 2025-03-19 PROCEDURE — 64483 NJX AA&/STRD TFRM EPI L/S 1: CPT | Performed by: PHYSICAL MEDICINE & REHABILITATION

## 2025-03-19 PROCEDURE — 2500000002 HC RX 250 W HCPCS SELF ADMINISTERED DRUGS (ALT 637 FOR MEDICARE OP, ALT 636 FOR OP/ED): Performed by: PHYSICAL MEDICINE & REHABILITATION

## 2025-03-19 RX ORDER — LIDOCAINE HYDROCHLORIDE 5 MG/ML
INJECTION, SOLUTION INFILTRATION; INTRAVENOUS AS NEEDED
Status: COMPLETED | OUTPATIENT
Start: 2025-03-19 | End: 2025-03-19

## 2025-03-19 RX ORDER — DEXAMETHASONE SODIUM PHOSPHATE 10 MG/ML
INJECTION INTRAMUSCULAR; INTRAVENOUS AS NEEDED
Status: COMPLETED | OUTPATIENT
Start: 2025-03-19 | End: 2025-03-19

## 2025-03-19 RX ORDER — DIAZEPAM 5 MG/1
5 TABLET ORAL ONCE AS NEEDED
Status: COMPLETED | OUTPATIENT
Start: 2025-03-19 | End: 2025-03-19

## 2025-03-19 RX ADMIN — DEXAMETHASONE SODIUM PHOSPHATE 10 MG: 10 INJECTION, SOLUTION INTRAMUSCULAR; INTRAVENOUS at 12:24

## 2025-03-19 RX ADMIN — LIDOCAINE HYDROCHLORIDE 5 ML: 5 INJECTION, SOLUTION INFILTRATION at 12:24

## 2025-03-19 RX ADMIN — IOHEXOL 2 ML: 240 INJECTION, SOLUTION INTRATHECAL; INTRAVASCULAR; INTRAVENOUS; ORAL at 12:25

## 2025-03-19 RX ADMIN — DIAZEPAM 5 MG: 5 TABLET ORAL at 12:10

## 2025-03-19 ASSESSMENT — PAIN SCALES - GENERAL
PAINLEVEL_OUTOF10: 4
PAINLEVEL_OUTOF10: 1

## 2025-03-19 ASSESSMENT — PAIN DESCRIPTION - DESCRIPTORS: DESCRIPTORS: ACHING

## 2025-03-19 ASSESSMENT — PATIENT HEALTH QUESTIONNAIRE - PHQ9
2. FEELING DOWN, DEPRESSED OR HOPELESS: NOT AT ALL
SUM OF ALL RESPONSES TO PHQ9 QUESTIONS 1 AND 2: 0
1. LITTLE INTEREST OR PLEASURE IN DOING THINGS: NOT AT ALL

## 2025-03-19 ASSESSMENT — PAIN - FUNCTIONAL ASSESSMENT: PAIN_FUNCTIONAL_ASSESSMENT: 0-10

## 2025-03-19 NOTE — DISCHARGE INSTRUCTIONS
DISCHARGE INSTRUCTIONS FOR INJECTIONS     After most injections, it is recommended that you relax and limit your activity for the remainder of the day unless you have been told otherwise by your pain physician.  You should not drive a car, operate machinery, or make important legal decisions unless otherwise directed by your pain physician.  You may resume your normal activity, including exercise, tomorrow.      Keep a written pain diary of how much pain relief you experienced following the injection procedure and the length of time of pain relief you experienced pain relief. Following diagnostic injections like medial branch nerve blocks, genicular nerve blocks, sacroiliac joint blocks, stellate ganglion injections and other blocks, it is very important you record the specific amount of pain relief you experienced immediately after the injection and how long it lasted. If you have been given Questionnaire paperwork to fill out out after your diagnostic block please call [phone] and leave a detailed voicemail with the answers to the questions you were given. This information is vital to getting approval for next steps.    Observe the needle site for excessive bleeding (slow general oozing that completely soaks the dressing or fresh bright red bleeding).  In either case, apply pressure to the area, elevate it if possible and call your doctor at once.    For all injections, please keep the injection site dry and inspect the site for a couple of days. You may remove the Band-Aid the day of the injection at any time.     Keep the needle site clean & dry for 24 hours.   no soaking baths, hot tubs, whirlpools or swimming pools for 2-3 days.     Some discomfort, bruising or slight swelling may occur at the injection site. This is not abnormal if it occurs.  If needed you may:    -Take over the counter medication such as Tylenol or Motrin.   -Apply an ice pack for 30 minutes, 2 to 3 times a day for the first 24  hours.    If you are given steroids in your injection, your pain may not be gone immediately after this procedure. It generally takes 3-5 days for the steroid to work but it can take up to 2 weeks. may You may notice a worsening of your symptoms for 1-2 days after the injection. This is not abnormal.  You may use acetaminophen, ibuprofen, or prescription medication that your doctor may have prescribed for you if you need to do so.     A few common side effects of steroids include facial flushing, sweating, restlessness, irritability,difficulty sleeping, increase in blood sugar, and increased blood pressure. If you have diabetes, please monitor your blood sugar at least once a day for at least 5 days. If you have poorly controlled high blood pressure, monitoryour blood pressure for at least 2 days and contact your primary care physician if these numbers are unusually high for you.        Call  Pain Management at [phone] between 8am-4pm Monday - Friday if you are experiencing the following:    If you received an epidural or spinal injection:    -Headache that does not go away with medicine, is worse when sitting or standing up, and is greatly relieved upon lying down.   -Severe pain worse than or different than your baseline pain.   -Chills or fever (101º F or greater).   -Drainage or signs of infection at the injection site     Go directly to the Emergency Department if you are experiencing the following and received an epidural or spinal injection:   -Abrupt weakness or progressive weakness in your legs that starts after you leave the clinic.   -Abrupt severe or worsening numbness in your legs.   -Inability to urinate after the injection or loss of bowel or bladder control without the urge to defecate or urinate.     If you have a clinical question that cannot wait until your next appointment, please call [phone] between 8am-4pm Monday - Friday. We do our best to return all non-emergency messages within  24-48 hours, Monday - Friday. A nurse or physician will return your message. You may also try calling and they will do their best to answer your question(s):    [facility] nurse [phone]  After hours pain management [phone]    If you need to cancel an appointment, please call the scheduling staff at [phone] during normal business hours or leave a message at least 24 hours in advance.

## 2025-03-20 NOTE — ADDENDUM NOTE
Encounter addended by: Lissa San RN on: 3/20/2025 9:03 AM   Actions taken: Contacts section saved, Flowsheet accepted

## 2025-05-20 DIAGNOSIS — R10.2 CHRONIC PAIN IN FEMALE PELVIS: ICD-10-CM

## 2025-05-20 DIAGNOSIS — G89.29 CHRONIC PAIN IN FEMALE PELVIS: ICD-10-CM

## 2025-05-23 RX ORDER — GABAPENTIN 300 MG/1
600 CAPSULE ORAL 3 TIMES DAILY
Qty: 180 CAPSULE | Refills: 3 | Status: SHIPPED | OUTPATIENT
Start: 2025-05-23